# Patient Record
Sex: FEMALE | Race: AMERICAN INDIAN OR ALASKA NATIVE | NOT HISPANIC OR LATINO | Employment: FULL TIME | URBAN - METROPOLITAN AREA
[De-identification: names, ages, dates, MRNs, and addresses within clinical notes are randomized per-mention and may not be internally consistent; named-entity substitution may affect disease eponyms.]

---

## 2017-02-06 ENCOUNTER — NON-PROVIDER VISIT (OUTPATIENT)
Dept: OBGYN | Facility: CLINIC | Age: 22
End: 2017-02-06
Payer: MEDICAID

## 2017-02-06 DIAGNOSIS — Z32.01 PREGNANCY EXAMINATION OR TEST, POSITIVE RESULT: ICD-10-CM

## 2017-02-06 LAB
INT CON NEG: NEGATIVE
INT CON POS: POSITIVE
POC URINE PREGNANCY TEST: POSITIVE

## 2017-02-06 PROCEDURE — 81025 URINE PREGNANCY TEST: CPT | Performed by: NURSE PRACTITIONER

## 2017-02-28 ENCOUNTER — INITIAL PRENATAL (OUTPATIENT)
Dept: OBGYN | Facility: CLINIC | Age: 22
End: 2017-02-28
Payer: MEDICAID

## 2017-02-28 VITALS
WEIGHT: 141 LBS | HEIGHT: 64 IN | SYSTOLIC BLOOD PRESSURE: 112 MMHG | BODY MASS INDEX: 24.07 KG/M2 | DIASTOLIC BLOOD PRESSURE: 70 MMHG

## 2017-02-28 DIAGNOSIS — N93.8 DUB (DYSFUNCTIONAL UTERINE BLEEDING): ICD-10-CM

## 2017-02-28 LAB — IN CLINIC OB SCAN: NORMAL

## 2017-02-28 PROCEDURE — 76830 TRANSVAGINAL US NON-OB: CPT | Performed by: OBSTETRICS & GYNECOLOGY

## 2017-02-28 PROCEDURE — 99203 OFFICE O/P NEW LOW 30 MIN: CPT | Mod: 25 | Performed by: OBSTETRICS & GYNECOLOGY

## 2017-02-28 NOTE — PROGRESS NOTES
"CC: Confirmation of Pregnancy    HPI: Pt is a 20 yo  lmp unknown who presents for evaluation of amenorrhea.  She has had no bleeding since her last menses.  A urine pregnancy test was positive.  She denies vaginal spotting or pain.  She notes nausea and vomiting.      ROS: negative for dizziness, SOB, chest pain, palpitations, dysuria, vaginal discharge.    Blood pressure 112/70, height 1.626 m (5' 4\"), weight 63.957 kg (141 lb).    GENERAL: Alert, in no apparent distress  PSYCHIATRIC: Appropriate affect, intact insight and judgement.  ABDOMEN: Soft, nontender, nondistended.  No palpable masses. No hepatosplenomegaly.   No rebound or guarding.  No inguinal lymphadenopathy.  BACK: No CVA tenderness  EXTREMITIES: No edema, no calf tenderness.    GENITOURINARY:  Normal external genitalia, no lesions.  Normal urethral meatus, no masses or tenderness.  Normal bladder without fullness or masses.  Vagina well estrogenized, no vaginal discharge or lesions.  Cervix normal length, nontender.  Uterus 10 weeks size, normal shape and contour, nontender.  Adnexa nontender, no masses.  Normal anus and perineum.      TRANSVAGINAL ULTRASOUND - performed and interpreted by me    Chen intrauterine pregnancy with CRL measuring 2.93 cm, c/w 9 +5/7 weeks EGA, positive fetal cardiac activity noted. EDC 17.     No fluid in cul de sac.    Ovaries and cervix appear grossly normal. Cervix 3.78 cm.      ASSESSMENT/PLAN:  1. DUB visit - Chen IUP at 9 +5/7 wks.  Prenatal Vitamins.  F/U for NOB appointment 2-3 wks.  "

## 2017-02-28 NOTE — MR AVS SNAPSHOT
"Kadi Avendano   2017 9:30 AM   Initial Prenatal   MRN: [id number]    [facility]   Dept Phone:  [phone]    Description:  Female : 1995   Provider:  Rocio Carrero M.D.           Allergies as of 2017     No Known Allergies      Vital Signs     Blood Pressure Height Weight Body Mass Index Last Menstrual Period Smoking Status    112/70 mmHg 1.626 m (5' 4\") 63.957 kg (141 lb) 24.19 kg/m2 (LMP Unknown) Never Smoker       Basic Information     Date Of Birth Sex Race Ethnicity Preferred Language    1995 Female Unknown Unknown English      Your appointments     2017  9:30 AM   New OB Exam 1 with Rocio Carrero M.D.   [facility] [address] [id number] [phone]              Health Maintenance        Date Due Completion Dates    IMM HEP B VACCINE (1 of 3 - Primary Series) 1995 ---    IMM HEP A VACCINE (1 of 2 - Standard Series) 1996 ---    IMM HPV VACCINE (1 of 3 - Female 3 Dose Series) 2006 ---    IMM VARICELLA (CHICKENPOX) VACCINE (1 of 2 - 2 Dose Adolescent Series) 2008 ---    IMM MENINGOCOCCAL VACCINE (MCV4) (1 of 1) 2011 ---    IMM DTaP/Tdap/Td Vaccine (1 - Tdap) 2014 ---    IMM INFLUENZA (1) 2016 ---    PAP SMEAR 2016 ---            Current Immunizations     No immunizations on file.      Below and/or attached are the medications your provider expects you to take. Review all of your home medications and newly ordered medications with your provider and/or pharmacist. Follow medication instructions as directed by your provider and/or pharmacist. Please keep your medication list with you and share with your provider. Update the information when medications are discontinued, doses are changed, or new medications (including over-the-counter products) are added; and carry medication information at all times in the event of emergency situations     Allergies:  No Known " Allergies          Medications  Valid as of: February 28, 2017 -  9:26 AM    Generic Name Brand Name Tablet Size Instructions for use    Prenatal Vit w/ Fe Bisg-FA   Take  by mouth.        .                 Medicines prescribed today were sent to:     [facility]  [place] - [address]    [address] [place] [address]    Phone: [phone] Fax: [phone]    Open 24 Hours?: No      Medication refill instructions:       If your prescription bottle indicates you have medication refills left, it is not necessary to call your provider’s office. Please contact your pharmacy and they will refill your medication.    If your prescription bottle indicates you do not have any refills left, you may request refills at any time through one of the following ways: The online Huaqi Information Digital system (except Urgent Care), by calling your provider’s office, or by asking your pharmacy to contact your provider’s office with a refill request. Medication refills are processed only during regular business hours and may not be available until the next business day. Your provider may request additional information or to have a follow-up visit with you prior to refilling your medication.   *Please Note: Medication refills are assigned a new Rx number when refilled electronically. Your pharmacy may indicate that no refills were authorized even though a new prescription for the same medication is available at the pharmacy. Please request the medicine by name with the pharmacy before contacting your provider for a refill.           Huaqi Information Digital Access Code: [email or web address]  Expires: 3/8/2017  9:44 AM    Your email address is not on file at [facility].  Email Addresses are required for you to sign up for [employer] Digital, please contact [phone] to verify your personal information and to provide your email address prior to attempting to register for [employer]qi Information Digital[place]  [address]    Property Moose  A secure, online tool to manage your health information     [employer]’s Cardiome Pharmat® is a secure, online tool that connects you to your personalized health information from the privacy of your home -- day or night - making it very easy for you to manage your healthcare. Once the activation process is completed, you can even access your medical information using the Property Moose imelda, which is available for free in the Apple Imelda store or Google Play store.     To learn more about Property Moose, visit [email or web address]    There are two levels of access available (as shown below):   My Chart Features  [facility] Primary Care Doctor [facility]  Specialists [facility]  Urgent  Care Non-[facility] Primary Care Doctor   Email your healthcare team securely and privately 24/7 X X X    Manage appointments: schedule your next appointment; view details of past/upcoming appointments X      Request prescription refills. X      View recent personal medical records, including lab and immunizations X X X X   View health record, including health history, allergies, medications X X X X   Read reports about your outpatient visits, procedures, consult and ER notes X X X X   See your discharge summary, which is a recap of your hospital and/or ER visit that includes your diagnosis, lab results, and care plan X X  X     How to register for Property Moose:  Once your e-mail address has been verified, follow the following steps to sign up for Property Moose.     1. Go to  [email or web address]  2. Click on the Sign Up Now box, which takes you to the New Member Sign Up page. You will need to provide the following information:  a. Enter your Property Moose Access Code exactly as it appears at the top of this page. (You will not need to use this code after you’ve completed the sign-up process. If you do not sign up before the expiration date, you must request a new code.)   b. Enter your date of birth.   c. Enter your home email address.   d. Click Submit, and  follow the next screen’s instructions.  3. Create a Skuldtecht ID. This will be your EternoGen login ID and cannot be changed, so think of one that is secure and easy to remember.  4. Create a Skuldtecht password. You can change your password at any time.  5. Enter your Password Reset Question and Answer. This can be used at a later time if you forget your password.   6. Enter your e-mail address. This allows you to receive e-mail notifications when new information is available in EternoGen.  7. Click Sign Up. You can now view your health information.    For assistance activating your EternoGen account, call [phone]

## 2017-02-28 NOTE — PROGRESS NOTES
today. Unsure about LMP  On PNV  Phone # [phone]  Pharmacy verified with patient  C/o n/v

## 2017-04-12 ENCOUNTER — INITIAL PRENATAL (OUTPATIENT)
Dept: OBGYN | Facility: CLINIC | Age: 22
End: 2017-04-12
Payer: MEDICAID

## 2017-04-12 ENCOUNTER — HOSPITAL ENCOUNTER (OUTPATIENT)
Facility: MEDICAL CENTER | Age: 22
End: 2017-04-12
Attending: NURSE PRACTITIONER
Payer: MEDICAID

## 2017-04-12 VITALS
BODY MASS INDEX: 23.9 KG/M2 | WEIGHT: 140 LBS | HEIGHT: 64 IN | SYSTOLIC BLOOD PRESSURE: 100 MMHG | DIASTOLIC BLOOD PRESSURE: 62 MMHG

## 2017-04-12 DIAGNOSIS — Z34.02 SUPERVISION OF NORMAL FIRST PREGNANCY IN SECOND TRIMESTER: ICD-10-CM

## 2017-04-12 PROBLEM — Z34.00 SUPERVISION OF NORMAL FIRST PREGNANCY: Status: ACTIVE | Noted: 2017-04-12

## 2017-04-12 LAB
APPEARANCE UR: NORMAL
BILIRUB UR STRIP-MCNC: NORMAL MG/DL
COLOR UR AUTO: NORMAL
GLUCOSE UR STRIP.AUTO-MCNC: NEGATIVE MG/DL
KETONES UR STRIP.AUTO-MCNC: NEGATIVE MG/DL
LEUKOCYTE ESTERASE UR QL STRIP.AUTO: NORMAL
NITRITE UR QL STRIP.AUTO: NEGATIVE
PH UR STRIP.AUTO: 6 [PH] (ref 5–8)
PROT UR QL STRIP: NEGATIVE MG/DL
RBC UR QL AUTO: NEGATIVE
SP GR UR STRIP.AUTO: 1.02
UROBILINOGEN UR STRIP-MCNC: NORMAL MG/DL

## 2017-04-12 PROCEDURE — 59401 PR NEW OB VISIT: CPT | Performed by: NURSE PRACTITIONER

## 2017-04-12 PROCEDURE — 87591 N.GONORRHOEAE DNA AMP PROB: CPT

## 2017-04-12 PROCEDURE — 87491 CHLMYD TRACH DNA AMP PROBE: CPT

## 2017-04-12 PROCEDURE — 88175 CYTOPATH C/V AUTO FLUID REDO: CPT

## 2017-04-12 PROCEDURE — 81002 URINALYSIS NONAUTO W/O SCOPE: CPT | Performed by: NURSE PRACTITIONER

## 2017-04-12 NOTE — PROGRESS NOTES
"S:  Kadi Avendano is a 21 y.o.  who presents for her new OB exam.  She is 15w6d with and VICTORIA of Estimated Date of Delivery: 17 by . She has no complaints. Nausea was mostly in first trimester.  She is currently working with autistic people. No heavy lifting no chemical exposure. No ER visits or previous care in this pregnancy.     desires AFP.  declines CF.  Denies VB, LOF, or cramping.  Denies dysuria, vaginal DC. Reports no or possible fetal movement.     Pt is single and lives with FOB.  Pregnancy is desired.      Past Medical History   Diagnosis Date   • Anemia      Family History   Problem Relation Age of Onset   • No Known Problems Mother    • No Known Problems Father    • No Known Problems Brother      Social History     Social History   • Marital Status: Single     Spouse Name: N/A   • Number of Children: N/A   • Years of Education: N/A     Occupational History   • Not on file.     Social History Main Topics   • Smoking status: Never Smoker    • Smokeless tobacco: Never Used   • Alcohol Use: No   • Drug Use: No   • Sexual Activity:     Partners: Male      Comment: none. unplanned pregnancy     Other Topics Concern   • Not on file     Social History Narrative     OB History    Para Term  AB SAB TAB Ectopic Multiple Living   1               # Outcome Date GA Lbr Silvino/2nd Weight Sex Delivery Anes PTL Lv   1 Current                   History of HSV I or II in self or partner: no  History of Thyroid problems: no    O:    Filed Vitals:    17 0848   BP: 100/62   Height: 1.626 m (5' 4\")   Weight: 63.504 kg (140 lb)      See Prenatal Physical.    Wet mount: none      A:   1.  IUP @ 15w6d per         2.  S=D        3.  See problem list below              Patient Active Problem List    Diagnosis Date Noted   • Supervision of normal first pregnancy 2017         P:  1.  GC/CT & pap done        2.  Prenatal labs ordered - lab slip given        3.  Discussed PNV, diet, " avoidances and adequate water intake        4.  NOB packet given        5.  Return to office in 4 wks        6.  Complete OB US about 3 wks           No orders of the defined types were placed in this encounter.

## 2017-04-12 NOTE — MR AVS SNAPSHOT
"        Kadi Avendano   2017 8:30 AM   Initial Prenatal   MRN: [id number]    [facility]   Dept Phone:  [phone]    Description:  Female : 1995   Provider:  Ame Prajapati D.N.P.; [facility] INTAKE           Allergies as of 2017     No Known Allergies      You were diagnosed with     Supervision of normal first pregnancy in second trimester   [[id number]]         Vital Signs     Blood Pressure Height Weight Body Mass Index Last Menstrual Period Smoking Status    100/62 mmHg 1.626 m (5' 4\") 63.504 kg (140 lb) 24.02 kg/m2 (LMP Unknown) Never Smoker       Basic Information     Date Of Birth Sex Race Ethnicity Preferred Language    1995 Female Unknown Unknown English      Problem List              ICD-10-CM Priority Class Noted - Resolved    Supervision of normal first pregnancy Z34.00   2017 - Present      Health Maintenance        Date Due Completion Dates    IMM HEP B VACCINE (1 of 3 - Primary Series) 1995 ---    IMM HEP A VACCINE (1 of 2 - Standard Series) 1996 ---    IMM HPV VACCINE (1 of 3 - Female 3 Dose Series) 2006 ---    IMM VARICELLA (CHICKENPOX) VACCINE (1 of 2 - 2 Dose Adolescent Series) 2008 ---    IMM MENINGOCOCCAL VACCINE (MCV4) (1 of 1) 2011 ---    IMM DTaP/Tdap/Td Vaccine (1 - Tdap) 2014 ---    PAP SMEAR 2016 ---            Current Immunizations     No immunizations on file.      Below and/or attached are the medications your provider expects you to take. Review all of your home medications and newly ordered medications with your provider and/or pharmacist. Follow medication instructions as directed by your provider and/or pharmacist. Please keep your medication list with you and share with your provider. Update the information when medications are discontinued, doses are changed, or new medications (including over-the-counter products) are added; and carry medication information at all times in the event of emergency " situations     Allergies:  No Known Allergies          Medications  Valid as of: April 12, 2017 -  9:19 AM    Generic Name Brand Name Tablet Size Instructions for use    Prenatal Vit w/ Fe Bisg-FA   Take  by mouth.        .                 Medicines prescribed today were sent to:     [facility] [place] - [address]    [address] [place] [address]    Phone: [phone] Fax: [phone]    Open 24 Hours?: No      Medication refill instructions:       If your prescription bottle indicates you have medication refills left, it is not necessary to call your provider’s office. Please contact your pharmacy and they will refill your medication.    If your prescription bottle indicates you do not have any refills left, you may request refills at any time through one of the following ways: The online Myla system (except Urgent Care), by calling your provider’s office, or by asking your pharmacy to contact your provider’s office with a refill request. Medication refills are processed only during regular business hours and may not be available until the next business day. Your provider may request additional information or to have a follow-up visit with you prior to refilling your medication.   *Please Note: Medication refills are assigned a new Rx number when refilled electronically. Your pharmacy may indicate that no refills were authorized even though a new prescription for the same medication is available at the pharmacy. Please request the medicine by name with the pharmacy before contacting your provider for a refill.        Your To Do List     Future Labs/Procedures Complete By Expires    AFP MATERNAL SERUM ALPHA-FETOPROTEIN  As directed 4/12/2018    PREG CNTR PRENATAL PN  As directed 4/12/2018    THINPREP RFLX HPV ASCUS W/CTNG  As directed 4/12/2018    US-OB 2ND 3RD TRI COMPLETE  As directed 4/12/2018         Myla Access Code: [email or web address]  Expires: 5/12/2017   9:19 AM    Your email address is not on file at [facility].  Email Addresses are required for you to sign up for Manta Media, please contact [phone] to verify your personal information and to provide your email address prior to attempting to register for [employer]anta [employer] [address]    Fingot  A secure, online tool to manage your health information     [employer]’s Manta Media® is a secure, online tool that connects you to your personalized health information from the privacy of your home -- day or night - making it very easy for you to manage your healthcare. Once the activation process is completed, you can even access your medical information using the Manta Media imelda, which is available for free in the Apple Imelda store or Google Play store.     To learn more about Manta Media, visit [email or web address]    There are two levels of access available (as shown below):   My Chart Features  [facility] Primary Care Doctor [facility]  Specialists [facility]  Urgent  Care Non-[facility] Primary Care Doctor   Email your healthcare team securely and privately 24/7 X X X    Manage appointments: schedule your next appointment; view details of past/upcoming appointments X      Request prescription refills. X      View recent personal medical records, including lab and immunizations X X X X   View health record, including health history, allergies, medications X X X X   Read reports about your outpatient visits, procedures, consult and ER notes X X X X   See your discharge summary, which is a recap of your hospital and/or ER visit that includes your diagnosis, lab results, and care plan X X  X     How to register for Fingot:  Once your e-mail address has been verified, follow the following steps to sign up for Fingot.     1. Go to  [email or web address]  2. Click on the Sign Up Now box, which takes you to the New Member Sign Up page. You will need to provide the following information:  a. Enter your  Detectent Access Code exactly as it appears at the top of this page. (You will not need to use this code after you’ve completed the sign-up process. If you do not sign up before the expiration date, you must request a new code.)   b. Enter your date of birth.   c. Enter your home email address.   d. Click Submit, and follow the next screen’s instructions.  3. Create a Detectent ID. This will be your Detectent login ID and cannot be changed, so think of one that is secure and easy to remember.  4. Create a Ardiant password. You can change your password at any time.  5. Enter your Password Reset Question and Answer. This can be used at a later time if you forget your password.   6. Enter your e-mail address. This allows you to receive e-mail notifications when new information is available in Detectent.  7. Click Sign Up. You can now view your health information.    For assistance activating your Detectent account, call [phone]

## 2017-04-12 NOTE — PROGRESS NOTES
Pt here today for NOB visit   Pt had   C/o nausea and sharp pain on bellybutton  Reports some FM  Desires AFP  Declines CF  WT:140lb  BP: 100/62  Good # [phone]

## 2017-04-13 LAB
C TRACH DNA GENITAL QL NAA+PROBE: NEGATIVE
CYTOLOGY REG CYTOL: NORMAL
N GONORRHOEA DNA GENITAL QL NAA+PROBE: NEGATIVE
SPECIMEN SOURCE: NORMAL

## 2017-04-21 ENCOUNTER — HOSPITAL ENCOUNTER (OUTPATIENT)
Dept: LAB | Facility: MEDICAL CENTER | Age: 22
End: 2017-04-21
Attending: NURSE PRACTITIONER
Payer: MEDICAID

## 2017-04-21 DIAGNOSIS — Z34.02 SUPERVISION OF NORMAL FIRST PREGNANCY IN SECOND TRIMESTER: ICD-10-CM

## 2017-04-21 LAB
ABO GROUP BLD: NORMAL
APPEARANCE UR: ABNORMAL
BASOPHILS # BLD AUTO: 0.4 % (ref 0–1.8)
BASOPHILS # BLD: 0.03 K/UL (ref 0–0.12)
BILIRUB UR QL STRIP.AUTO: NEGATIVE
BLD GP AB SCN SERPL QL: NORMAL
COLOR UR: YELLOW
CULTURE IF INDICATED INDCX: YES UA CULTURE
EOSINOPHIL # BLD AUTO: 0.02 K/UL (ref 0–0.51)
EOSINOPHIL NFR BLD: 0.3 % (ref 0–6.9)
EPI CELLS #/AREA URNS HPF: ABNORMAL /HPF
ERYTHROCYTE [DISTWIDTH] IN BLOOD BY AUTOMATED COUNT: 41.4 FL (ref 35.9–50)
GLUCOSE UR STRIP.AUTO-MCNC: NEGATIVE MG/DL
HBV SURFACE AG SER QL: NEGATIVE
HCT VFR BLD AUTO: 42.3 % (ref 37–47)
HGB BLD-MCNC: 14.3 G/DL (ref 12–16)
HIV 1+2 AB+HIV1 P24 AG SERPL QL IA: NON REACTIVE
IMM GRANULOCYTES # BLD AUTO: 0.06 K/UL (ref 0–0.11)
IMM GRANULOCYTES NFR BLD AUTO: 0.9 % (ref 0–0.9)
KETONES UR STRIP.AUTO-MCNC: NEGATIVE MG/DL
LEUKOCYTE ESTERASE UR QL STRIP.AUTO: ABNORMAL
LYMPHOCYTES # BLD AUTO: 1.85 K/UL (ref 1–4.8)
LYMPHOCYTES NFR BLD: 27.4 % (ref 22–41)
MCH RBC QN AUTO: 29 PG (ref 27–33)
MCHC RBC AUTO-ENTMCNC: 33.8 G/DL (ref 33.6–35)
MCV RBC AUTO: 85.8 FL (ref 81.4–97.8)
MICRO URNS: ABNORMAL
MONOCYTES # BLD AUTO: 0.44 K/UL (ref 0–0.85)
MONOCYTES NFR BLD AUTO: 6.5 % (ref 0–13.4)
MUCOUS THREADS #/AREA URNS HPF: ABNORMAL /HPF
NEUTROPHILS # BLD AUTO: 4.35 K/UL (ref 2–7.15)
NEUTROPHILS NFR BLD: 64.5 % (ref 44–72)
NITRITE UR QL STRIP.AUTO: NEGATIVE
NRBC # BLD AUTO: 0 K/UL
NRBC BLD AUTO-RTO: 0 /100 WBC
PH UR STRIP.AUTO: 6.5 [PH]
PLATELET # BLD AUTO: 240 K/UL (ref 164–446)
PMV BLD AUTO: 11.5 FL (ref 9–12.9)
PROT UR QL STRIP: NEGATIVE MG/DL
RBC # BLD AUTO: 4.93 M/UL (ref 4.2–5.4)
RBC # URNS HPF: ABNORMAL /HPF
RBC UR QL AUTO: NEGATIVE
RH BLD: NORMAL
RUBV AB SER QL: 23.8 IU/ML
SP GR UR STRIP.AUTO: 1.02
SPERM #/AREA URNS HPF: PRESENT /HPF
TREPONEMA PALLIDUM IGG+IGM AB [PRESENCE] IN SERUM OR PLASMA BY IMMUNOASSAY: NON REACTIVE
WBC # BLD AUTO: 6.8 K/UL (ref 4.8–10.8)
WBC #/AREA URNS HPF: ABNORMAL /HPF

## 2017-04-21 PROCEDURE — 87086 URINE CULTURE/COLONY COUNT: CPT

## 2017-04-21 PROCEDURE — 82105 ALPHA-FETOPROTEIN SERUM: CPT

## 2017-04-21 PROCEDURE — 86780 TREPONEMA PALLIDUM: CPT

## 2017-04-21 PROCEDURE — 81001 URINALYSIS AUTO W/SCOPE: CPT

## 2017-04-21 PROCEDURE — 86762 RUBELLA ANTIBODY: CPT

## 2017-04-21 PROCEDURE — 86900 BLOOD TYPING SEROLOGIC ABO: CPT

## 2017-04-21 PROCEDURE — 86901 BLOOD TYPING SEROLOGIC RH(D): CPT

## 2017-04-21 PROCEDURE — 87340 HEPATITIS B SURFACE AG IA: CPT

## 2017-04-21 PROCEDURE — 36415 COLL VENOUS BLD VENIPUNCTURE: CPT

## 2017-04-21 PROCEDURE — 87389 HIV-1 AG W/HIV-1&-2 AB AG IA: CPT

## 2017-04-21 PROCEDURE — 86850 RBC ANTIBODY SCREEN: CPT

## 2017-04-21 PROCEDURE — 85025 COMPLETE CBC W/AUTO DIFF WBC: CPT

## 2017-04-23 LAB
BACTERIA UR CULT: ABNORMAL
BACTERIA UR CULT: ABNORMAL
SIGNIFICANT IND 70042: ABNORMAL
SOURCE SOURCE: ABNORMAL

## 2017-04-24 LAB
# FETUSES US: NORMAL
AFP MOM SERPL: 0.73
AFP SERPL-MCNC: 29 NG/ML
AGE - REPORTED: 21.7 YR
GA METHOD: NORMAL
GA: 17.14 WEEKS
IDDM PATIENT QL: NO
INTEGRATED SCN PATIENT-IMP: NORMAL

## 2017-05-08 ENCOUNTER — HOSPITAL ENCOUNTER (OUTPATIENT)
Dept: LAB | Facility: MEDICAL CENTER | Age: 22
End: 2017-05-08
Attending: NURSE PRACTITIONER
Payer: MEDICAID

## 2017-05-08 ENCOUNTER — ROUTINE PRENATAL (OUTPATIENT)
Dept: OBGYN | Facility: CLINIC | Age: 22
End: 2017-05-08
Payer: MEDICAID

## 2017-05-08 VITALS — SYSTOLIC BLOOD PRESSURE: 106 MMHG | DIASTOLIC BLOOD PRESSURE: 70 MMHG | BODY MASS INDEX: 24.88 KG/M2 | WEIGHT: 145 LBS

## 2017-05-08 DIAGNOSIS — Z34.00 SUPERVISION OF NORMAL FIRST PREGNANCY, ANTEPARTUM: ICD-10-CM

## 2017-05-08 PROCEDURE — 90040 PR PRENATAL FOLLOW UP: CPT | Performed by: NURSE PRACTITIONER

## 2017-05-08 PROCEDURE — 36415 COLL VENOUS BLD VENIPUNCTURE: CPT

## 2017-05-08 PROCEDURE — 81511 FTL CGEN ABNOR FOUR ANAL: CPT

## 2017-05-08 RX ORDER — HYDROCORTISONE ACETATE 25 MG/1
25 SUPPOSITORY RECTAL EVERY 12 HOURS
Qty: 20 SUPPOSITORY | Refills: 1 | Status: ON HOLD | OUTPATIENT
Start: 2017-05-08 | End: 2017-10-04

## 2017-05-08 NOTE — PROGRESS NOTES
OB f/u. some fetal movement.  No VB, LOF or UC's.  Wt: 145lb      BP: 106/70  Good phone # [phone]  Preferred pharmacy confirmed.  C/o hemorrhoids.  AFP done only for NTD. Patient agrees to do tetra today

## 2017-05-08 NOTE — PROGRESS NOTES
S) Pt is a 21 y.o.   at 19w4d  gestation. Routine prenatal care today. AFP done was the wrong one, only for NTD. Not the tetra. Patient wants to repeat the test for the quad.  Reports positive  fetal movement. Denies cramping, bleeding or leaking of fluid. Denies dysuria. Generally feels well today. Good self-care activities identified. Denies headaches.     O) see flow         Filed Vitals:    17 0817   BP: 106/70   Weight: 65.772 kg (145 lb)           Lab: normal prenatal panel       Pertinent ultrasound - scheduled for Friday.            A) IUP at 19w4d       S=D         Patient Active Problem List    Diagnosis Date Noted   • Supervision of normal first pregnancy 2017       P) s/s ptl vs general discomforts. Fetal movements reviewed. General ed and anticipatory guidance. Nutrition/exercise/vitamin. Plans breast. Continue PNV.

## 2017-05-08 NOTE — MR AVS SNAPSHOT
Kadi Avendano   2017 8:15 AM   Routine Prenatal   MRN: [id number]    Department:  [facility]   Dept Phone:  [phone]    Description:  Female : 1995   Provider:  Ame Prajapati D.N.P.           Allergies as of 2017     No Known Allergies      You were diagnosed with     Supervision of normal first pregnancy, antepartum   [4664445]         Vital Signs     Blood Pressure Weight Last Menstrual Period Smoking Status          106/70 mmHg 65.772 kg (145 lb) (LMP Unknown) Never Smoker         Basic Information     Date Of Birth Sex Race Ethnicity Preferred Language    1995 Female Unknown Unknown English      Your appointments     May 12, 2017  9:00 AM   US PREG 60 with PREG CTR US 1   [facility] ([facility])    [facility]  [address] [id number] [phone]           For [facility] patients only: 1. Please arrive 15 min prior to your appointment time. 2. If you're late, you will be rescheduled for the next available appointment. 3. If you need to reschedule your appointment, please call us at [phone] 48 hours prior to your appointment. 4. Do not bring children as they will not be allowed in exam room. 5. Only one family member may be present in room during exam. 6. The exam will be 30-60 minutes depending on the exam ordered by the physician. 7. The sonographer is not allowed to discuss findings during the exam. Your provider will go over the results with you at your next appointment. 8. The purpose of this ultrasound is to determine if baby is healthy. Diagnostic ultrasounds are NOT to determine the gender of the baby. 9. NO photography or video recording is allowed in exam room. 10. NO cell phones allowed in the exam room. INFORMACION SOBRE LUCAS ULTRASONIDO 1. Por favor de llegar 15 minutos antes de lucas constantino. 2. Si llega tarde, le tenemos que cambiar la constantino para otra fecha. 3. Si necesita  cambiar lucas constantino, por favor llame 48 horas antes de la constantino. [phone] 4. Por favor no traer niños. No se permiten en cuarto de Ultrasonido. 5. Solamente se permite alexy persona en el cuarto errol el examen. 6. El examen dura 30-60 minutos, dependiendo del examen ordenado por el Doctor. 7. El Sonógrafo no está autorizado hablar sobre lucas examen. Lucas doctor o partera le va explicar los resultados en lucas próxima constantino. 8. El propósito del Ultrasonido es para determinar si lucas jen viene saludable. No es para determinar el sexo de lucas jen. 9. Por favor no fotos o cámaras de grabar. 10. No celulares permitidos en el cuarto de examen.              Problem List              ICD-10-CM Priority Class Noted - Resolved    Supervision of normal first pregnancy Z34.00   4/12/2017 - Present      Health Maintenance        Date Due Completion Dates    IMM HEP B VACCINE (1 of 3 - Primary Series) 1995 ---    IMM HEP A VACCINE (1 of 2 - Standard Series) 12/22/1996 ---    IMM HPV VACCINE (1 of 3 - Female 3 Dose Series) 12/22/2006 ---    IMM VARICELLA (CHICKENPOX) VACCINE (1 of 2 - 2 Dose Adolescent Series) 12/22/2008 ---    IMM MENINGOCOCCAL VACCINE (MCV4) (1 of 1) 12/22/2011 ---    IMM DTaP/Tdap/Td Vaccine (1 - Tdap) 12/22/2014 ---    PAP SMEAR 4/12/2020 4/12/2017            Current Immunizations     No immunizations on file.      Below and/or attached are the medications your provider expects you to take. Review all of your home medications and newly ordered medications with your provider and/or pharmacist. Follow medication instructions as directed by your provider and/or pharmacist. Please keep your medication list with you and share with your provider. Update the information when medications are discontinued, doses are changed, or new medications (including over-the-counter products) are added; and carry medication information at all times in the event of emergency situations     Allergies:  No Known Allergies             Medications  Valid as of: May 08, 2017 -  8:30 AM    Generic Name Brand Name Tablet Size Instructions for use    Hydrocortisone Acetate (Suppos) ANUSOL-HC 25 MG Insert 1 Suppository in rectum every 12 hours.        Prenatal Vit w/ Fe Bisg-FA   Take  by mouth.        .                 Medicines prescribed today were sent to:     [facility] [place] - [address] [facility] [address]    [address] [facility] [place] [address]    Phone: [phone] Fax: [phone]    Open 24 Hours?: No      Medication refill instructions:       If your prescription bottle indicates you have medication refills left, it is not necessary to call your provider’s office. Please contact your pharmacy and they will refill your medication.    If your prescription bottle indicates you do not have any refills left, you may request refills at any time through one of the following ways: The online [employer] system (except Urgent Care), by calling your provider’s office, or by asking your pharmacy to contact your provider’s office with a refill request. Medication refills are processed only during regular business hours and may not be available until the next business day. Your provider may request additional information or to have a follow-up visit with you prior to refilling your medication.   *Please Note: Medication refills are assigned a new Rx number when refilled electronically. Your pharmacy may indicate that no refills were authorized even though a new prescription for the same medication is available at the pharmacy. Please request the medicine by name with the pharmacy before contacting your provider for a refill.        Your To Do List     Future Labs/Procedures Complete By Aleja SLAUGHTER  As directed 5/9/2018         [employer] Access Code: Activation code not generated  Current [employer] Status: Active

## 2017-05-10 LAB
# FETUSES US: NORMAL
AFP MOM SERPL: 0.69
AFP SERPL-MCNC: 38 NG/ML
AGE - REPORTED: 21.7 YR
GA METHOD: NORMAL
GA: 19.57 WEEKS
HCG MOM SERPL: 0.41
HCG SERPL-ACNC: 8042 IU/L
IDDM PATIENT QL: NO
INHIBIN A MOM SERPL: 0.92
INHIBIN A SERPL-MCNC: 159 PG/ML
INTEGRATED SCN PATIENT-IMP: NORMAL
PATHOLOGY STUDY: NORMAL
U ESTRIOL MOM SERPL: 1.28
U ESTRIOL SERPL-MCNC: 2.59 NG/ML

## 2017-05-12 ENCOUNTER — APPOINTMENT (OUTPATIENT)
Dept: RADIOLOGY | Facility: IMAGING CENTER | Age: 22
End: 2017-05-12
Attending: NURSE PRACTITIONER
Payer: MEDICAID

## 2017-05-12 ENCOUNTER — DATING (OUTPATIENT)
Dept: OBGYN | Facility: CLINIC | Age: 22
End: 2017-05-12

## 2017-05-12 DIAGNOSIS — Z34.02 SUPERVISION OF NORMAL FIRST PREGNANCY IN SECOND TRIMESTER: ICD-10-CM

## 2017-05-12 PROCEDURE — 76805 OB US >/= 14 WKS SNGL FETUS: CPT | Performed by: OBSTETRICS & GYNECOLOGY

## 2017-05-15 ENCOUNTER — DATING (OUTPATIENT)
Dept: OBGYN | Facility: CLINIC | Age: 22
End: 2017-05-15

## 2017-05-15 ENCOUNTER — ROUTINE PRENATAL (OUTPATIENT)
Dept: OBGYN | Facility: CLINIC | Age: 22
End: 2017-05-15
Payer: MEDICAID

## 2017-05-15 VITALS — BODY MASS INDEX: 24.88 KG/M2 | SYSTOLIC BLOOD PRESSURE: 124 MMHG | WEIGHT: 145 LBS | DIASTOLIC BLOOD PRESSURE: 84 MMHG

## 2017-05-15 DIAGNOSIS — Z34.02 ENCOUNTER FOR SUPERVISION OF NORMAL FIRST PREGNANCY IN SECOND TRIMESTER: Primary | ICD-10-CM

## 2017-05-15 PROCEDURE — 90040 PR PRENATAL FOLLOW UP: CPT | Performed by: NURSE PRACTITIONER

## 2017-05-15 NOTE — PROGRESS NOTES
S: Pt is  at 20w4d here for Centering session #1/2.   No concerns.  Reports positive FM.  Denies VB, LOF,  RUCs or vaginal DC.     O:  Please see above vitals       FHTs: 155       Fundal ht: 20 cm    A: IUP 20w4d  Patient Active Problem List    Diagnosis Date Noted   • Supervision of normal first pregnancy 2017         P:  1.  Reviewed labs w pt        2.  Normal  AFP          3.  US is scheduled        4.  Questions answered        5.  F/u 4wks for Centering session #3    Centering Topics reviewed:   1.  Prenatal testing, genetic screening  2.  Common discomforts of pregnancy  3.  Body changes in pregnancy  4.  Healthy lifestyle choices  5.  Other: Nutrition and exercise

## 2017-05-15 NOTE — MR AVS SNAPSHOT
Kadi Romana   5/15/2017 9:00 AM   Routine Prenatal   MRN: [id number]    Department:  [facility]   Dept Phone:  [phone]    Description:  Female : 1995   Provider:  SILAS Cook           Allergies as of 5/15/2017     No Known Allergies      You were diagnosed with     Encounter for supervision of normal first pregnancy in second trimester   [[id number]]  -  Primary       Vital Signs     Blood Pressure Weight Last Menstrual Period Smoking Status          124/84 mmHg 65.772 kg (145 lb) (LMP Unknown) Never Smoker         Basic Information     Date Of Birth Sex Race Ethnicity Preferred Language    1995 Female Unknown Unknown English      Your appointments     2017  9:00 AM   Group Visit with CENTERING   [facility] ([facility])    [address] [place] [address] [address]  [place] [address]   [phone]            Jul 10, 2017  9:00 AM   Group Visit with CENTERING   [facility] ([facility])    [address] [place] [address] [address]  [place] [address]   [phone]            Aug 07, 2017  9:00 AM   Group Visit with CENTERING   [facility] ([facility])    [address] [place] [address] [address]  [place] [address]   [phone]            Aug 21, 2017  9:00 AM   Group Visit with CENTERING   [facility] ([facility])    [address] [place] [address] [address]  [place] [address]   [phone]            Sep 18, 2017  9:00 AM   Group Visit with CENTERING   [facility] ([facility])    [address] [place] [address] [address]  [place] [address]   [phone]            Oct 02, 2017  9:00 AM   Group Visit with CENTERING   [facility] ([facility])    [address] [place] [address] [address]  [place] [address]   [phone]              Problem List              ICD-10-CM Priority Class Noted - Resolved    Supervision of normal first pregnancy Z34.00   2017 - Present      Health Maintenance        Date Due Completion Dates    IMM HEP B VACCINE (1 of 3 - Primary Series) 1995 ---    IMM HEP A VACCINE (1 of 2 - Standard Series) 1996 ---       IMM HPV VACCINE (1 of 3 - Female 3 Dose Series) 12/22/2006 ---    IMM VARICELLA (CHICKENPOX) VACCINE (1 of 2 - 2 Dose Adolescent Series) 12/22/2008 ---    IMM MENINGOCOCCAL VACCINE (MCV4) (1 of 1) 12/22/2011 ---    IMM DTaP/Tdap/Td Vaccine (1 - Tdap) 12/22/2014 ---    PAP SMEAR 4/12/2020 4/12/2017            Current Immunizations     No immunizations on file.      Below and/or attached are the medications your provider expects you to take. Review all of your home medications and newly ordered medications with your provider and/or pharmacist. Follow medication instructions as directed by your provider and/or pharmacist. Please keep your medication list with you and share with your provider. Update the information when medications are discontinued, doses are changed, or new medications (including over-the-counter products) are added; and carry medication information at all times in the event of emergency situations     Allergies:  No Known Allergies          Medications  Valid as of: May 15, 2017 - 12:28 PM    Generic Name Brand Name Tablet Size Instructions for use    Hydrocortisone Acetate (Suppos) ANUSOL-HC 25 MG Insert 1 Suppository in rectum every 12 hours.        Prenatal Vit w/ Fe Bisg-FA   Take  by mouth.        .                 Medicines prescribed today were sent to:     [facility] [address] [place] [address]    Phone: [phone] Fax: [phone]    Open 24 Hours?: No      Medication refill instructions:       If your prescription bottle indicates you have medication refills left, it is not necessary to call your provider’s office. Please contact your pharmacy and they will refill your medication.    If your prescription bottle indicates you do not have any refills left, you may request refills at any time through one of the following ways: The online [email or web address] system (except Urgent Care), by calling your provider’s office, or by  asking your pharmacy to contact your provider’s office with a refill request. Medication refills are processed only during regular business hours and may not be available until the next business day. Your provider may request additional information or to have a follow-up visit with you prior to refilling your medication.   *Please Note: Medication refills are assigned a new Rx number when refilled electronically. Your pharmacy may indicate that no refills were authorized even though a new prescription for the same medication is available at the pharmacy. Please request the medicine by name with the pharmacy before contacting your provider for a refill.        Instructions          1.  Reviewed labs w pt        2.  Normal  AFP          3.  US is scheduled        4.  Questions answered        5.  F/u 4wks for Centering session #3            [employer] Access Code: Activation code not generated  Current [employer] Status: Active

## 2017-05-15 NOTE — PATIENT INSTRUCTIONS
1.  Reviewed labs w pt        2.  Normal  AFP          3.  US is scheduled        4.  Questions answered        5.  F/u 4wks for Centering session #3

## 2017-06-07 ENCOUNTER — ROUTINE PRENATAL (OUTPATIENT)
Dept: OBGYN | Facility: CLINIC | Age: 22
End: 2017-06-07
Payer: MEDICAID

## 2017-06-07 VITALS — SYSTOLIC BLOOD PRESSURE: 112 MMHG | DIASTOLIC BLOOD PRESSURE: 68 MMHG | BODY MASS INDEX: 25.22 KG/M2 | WEIGHT: 147 LBS

## 2017-06-07 DIAGNOSIS — Z34.00 SUPERVISION OF NORMAL FIRST PREGNANCY, ANTEPARTUM: ICD-10-CM

## 2017-06-07 PROCEDURE — 90040 PR PRENATAL FOLLOW UP: CPT | Performed by: NURSE PRACTITIONER

## 2017-06-07 NOTE — MR AVS SNAPSHOT
Kadi Schneiderer   2017 8:30 AM   Routine Prenatal   MRN: [id number]    Department:  [facility]   Dept Phone:  [phone]    Description:  Female : 1995   Provider:  Ame Prajapati D.N.P.           Allergies as of 2017     No Known Allergies      You were diagnosed with     Supervision of normal first pregnancy, antepartum   [[id number]]         Vital Signs     Blood Pressure Weight Last Menstrual Period Smoking Status          112/68 mmHg 66.679 kg (147 lb) (LMP Unknown) Never Smoker         Basic Information     Date Of Birth Sex Race Ethnicity Preferred Language    1995 Female Unknown Unknown English      Your appointments     2017  9:00 AM   Group Visit with CENTERING   [facility] ([facility])    [address] [place] [address] [address]  [place] [address]   [phone]            Jul 10, 2017  9:00 AM   Group Visit with CENTERING   [facility] ([facility])    [address] [place] [address] [address]  [place] [address]   [phone]            Aug 07, 2017  9:00 AM   Group Visit with CENTERING   [facility] ([facility])    [address] [place] [address] [address]  [place] [address]   [phone]            Aug 21, 2017  9:00 AM   Group Visit with CENTERING   [facility] ([facility])    [address] [facility] [address]  [place] [address]   [phone]            Sep 18, 2017  9:00 AM   Group Visit with CENTERING   [facility] ([facility])    [address] [place] [address] [address]  [place] [address]   [phone]            Oct 02, 2017  9:00 AM   Group Visit with CENTERING   [facility] ([facility])    [address] [place] [address] [address]  [place] [address]   [phone]              Problem List              ICD-10-CM Priority Class Noted - Resolved    Supervision of normal first pregnancy Z34.00   2017 - Present      Health Maintenance        Date Due Completion Dates    IMM HEP B VACCINE (1 of 3 - Primary Series) 1995 ---    IMM HEP A VACCINE (1 of 2 - Standard Series) 1996 ---    IMM HPV VACCINE (1 of 3 - Female  3 Dose Series) 12/22/2006 ---    IMM VARICELLA (CHICKENPOX) VACCINE (1 of 2 - 2 Dose Adolescent Series) 12/22/2008 ---    IMM MENINGOCOCCAL VACCINE (MCV4) (1 of 1) 12/22/2011 ---    IMM DTaP/Tdap/Td Vaccine (1 - Tdap) 12/22/2014 ---    PAP SMEAR 4/12/2020 4/12/2017            Current Immunizations     No immunizations on file.      Below and/or attached are the medications your provider expects you to take. Review all of your home medications and newly ordered medications with your provider and/or pharmacist. Follow medication instructions as directed by your provider and/or pharmacist. Please keep your medication list with you and share with your provider. Update the information when medications are discontinued, doses are changed, or new medications (including over-the-counter products) are added; and carry medication information at all times in the event of emergency situations     Allergies:  No Known Allergies          Medications  Valid as of: June 07, 2017 -  8:49 AM    Generic Name Brand Name Tablet Size Instructions for use    Hydrocortisone Acetate (Suppos) ANUSOL-HC 25 MG Insert 1 Suppository in rectum every 12 hours.        Prenatal Vit w/ Fe Bisg-FA   Take  by mouth.        .                 Medicines prescribed today were sent to:     [facility] [address] [place] [address]    Phone: [phone] Fax: [phone]    Open 24 Hours?: No      Medication refill instructions:       If your prescription bottle indicates you have medication refills left, it is not necessary to call your provider’s office. Please contact your pharmacy and they will refill your medication.    If your prescription bottle indicates you do not have any refills left, you may request refills at any time through one of the following ways: The online [employer] system (except Urgent Care), by calling your provider’s office, or by asking your pharmacy to contact your  provider’s office with a refill request. Medication refills are processed only during regular business hours and may not be available until the next business day. Your provider may request additional information or to have a follow-up visit with you prior to refilling your medication.   *Please Note: Medication refills are assigned a new Rx number when refilled electronically. Your pharmacy may indicate that no refills were authorized even though a new prescription for the same medication is available at the pharmacy. Please request the medicine by name with the pharmacy before contacting your provider for a refill.        Your To Do List     Future Labs/Procedures Complete By Expires    GLUCOSE 1HR GESTATIONAL  As directed 6/8/2018    HCT  As directed 6/8/2018    HGB  As directed 6/8/2018    T.PALLIDUM AB EIA  As directed 6/7/2018         MyChart Access Code: Activation code not generated  Current [employer] Status: Active

## 2017-06-07 NOTE — PROGRESS NOTES
S) Pt is a 21 y.o.   at 23w6d  gestation. Routine prenatal care today. States no problems. Very supportive FOB present and involved.  Reports good  fetal movement. Denies cramping, bleeding or leaking of fluid. Denies dysuria. Generally feels well today. Good self-care activities identified. Denies headaches.     O) see flow         Filed Vitals:    17 0835   BP: 112/68   Weight: 66.679 kg (147 lb)           Lab: normal prenatal panel, normal AFP       Pertinent ultrasound -        Normal fetal survey     A) IUP at 23w6d       S=D         Patient Active Problem List    Diagnosis Date Noted   • Supervision of normal first pregnancy 2017     P) s/s ptl vs general discomforts. Fetal movements reviewed. General ed and anticipatory guidance. Nutrition/exercise/vitamin. Plans breast. Continue PNV.

## 2017-06-07 NOTE — PROGRESS NOTES
Pt. Here for OB/FU today. Reports Good FM.   Good # [phone]  Pt states no complaints.   Pharmacy verified.   Pt given 1 HR GTT and RPR lab slip today along with instructions.

## 2017-06-09 ENCOUNTER — HOSPITAL ENCOUNTER (OUTPATIENT)
Dept: LAB | Facility: MEDICAL CENTER | Age: 22
End: 2017-06-09
Attending: NURSE PRACTITIONER
Payer: MEDICAID

## 2017-06-09 DIAGNOSIS — Z34.00 SUPERVISION OF NORMAL FIRST PREGNANCY, ANTEPARTUM: ICD-10-CM

## 2017-06-09 LAB
GLUCOSE 1H P 50 G GLC PO SERPL-MCNC: 78 MG/DL (ref 70–139)
HCT VFR BLD AUTO: 40.5 % (ref 37–47)
HGB BLD-MCNC: 13.3 G/DL (ref 12–16)
TREPONEMA PALLIDUM IGG+IGM AB [PRESENCE] IN SERUM OR PLASMA BY IMMUNOASSAY: NON REACTIVE

## 2017-06-09 PROCEDURE — 85014 HEMATOCRIT: CPT

## 2017-06-09 PROCEDURE — 36415 COLL VENOUS BLD VENIPUNCTURE: CPT

## 2017-06-09 PROCEDURE — 85018 HEMOGLOBIN: CPT

## 2017-06-09 PROCEDURE — 82950 GLUCOSE TEST: CPT

## 2017-06-09 PROCEDURE — 86780 TREPONEMA PALLIDUM: CPT

## 2017-06-12 ENCOUNTER — ROUTINE PRENATAL (OUTPATIENT)
Dept: OBGYN | Facility: CLINIC | Age: 22
End: 2017-06-12
Payer: MEDICAID

## 2017-06-12 VITALS — SYSTOLIC BLOOD PRESSURE: 125 MMHG | BODY MASS INDEX: 26.08 KG/M2 | WEIGHT: 152 LBS | DIASTOLIC BLOOD PRESSURE: 80 MMHG

## 2017-06-12 DIAGNOSIS — Z34.02 ENCOUNTER FOR SUPERVISION OF NORMAL FIRST PREGNANCY IN SECOND TRIMESTER: Primary | ICD-10-CM

## 2017-06-12 PROCEDURE — 90040 PR PRENATAL FOLLOW UP: CPT | Performed by: NURSE PRACTITIONER

## 2017-06-12 NOTE — PROGRESS NOTES
S:  Pt is  at 24w4d VICTORIA: Estimated Date of Delivery: 17 here for Centering session #3.  No concerns today.  Reports positive FM.  Denies VB, RUCs, LOF or vaginal DC.    O:  Please see above vitals.        FHTs: 143        Fundal ht: 24    A:  IUP at 24w4d  Patient Active Problem List    Diagnosis Date Noted   • Supervision of normal first pregnancy 2017       P:  1.   Reviewed labs/US w pt.        2.   Questions answered.          3.   F/u 4wks Centering session #4    Centering Topics Reviewed:   1.  Mental relaxation  2.  Breastfeeding  3.  The Family I want to have  4.  Other topics: constipation, nausea.

## 2017-06-12 NOTE — MR AVS SNAPSHOT
Kadi Romana   2017 9:00 AM   Routine Prenatal   MRN: [id number]    Department:  [facility]   Dept Phone:  [phone]    Description:  Female : 1995   Provider:  SILAS Cook           Allergies as of 2017     No Known Allergies      You were diagnosed with     Encounter for supervision of normal first pregnancy in second trimester   [[id number]]  -  Primary       Vital Signs     Blood Pressure Weight Last Menstrual Period Smoking Status          125/80 mmHg 68.947 kg (152 lb) (LMP Unknown) Never Smoker         Basic Information     Date Of Birth Sex Race Ethnicity Preferred Language    1995 Female Unknown Unknown English      Your appointments     Jul 10, 2017  9:00 AM   Group Visit with CENTERING   [facility] ([facility])    [address] [place] [address] [address]  [place] [address]   [phone]            Aug 07, 2017  9:00 AM   Group Visit with CENTERING   [facility] ([facility])    [address] [place] [address] [address]  [place] [address]   [phone]            Aug 21, 2017  9:00 AM   Group Visit with CENTERING   [facility] ([facility])    [address] [place] [address] [address]  [place] [address]   [phone]            Sep 18, 2017  9:00 AM   Group Visit with CENTERING   [facility] ([facility])    [address] [place] [address] [address]  [place] [address]   [phone]            Oct 02, 2017  9:00 AM   Group Visit with CENTERING   [facility] ([facility])    [address] [place] [address] [address]  [place] [address]   [phone]              Problem List              ICD-10-CM Priority Class Noted - Resolved    Supervision of normal first pregnancy Z34.00   2017 - Present      Health Maintenance        Date Due Completion Dates    IMM HEP B VACCINE (1 of 3 - Primary Series) 1995 ---    IMM HEP A VACCINE (1 of 2 - Standard Series) 1996 ---    IMM HPV VACCINE (1 of 3 - Female 3 Dose Series) 2006 ---    IMM VARICELLA (CHICKENPOX) VACCINE (1 of 2 - 2 Dose Adolescent Series) 2008 ---     IMM MENINGOCOCCAL VACCINE (MCV4) (1 of 1) 12/22/2011 ---    IMM DTaP/Tdap/Td Vaccine (1 - Tdap) 12/22/2014 ---    PAP SMEAR 4/12/2020 4/12/2017            Current Immunizations     No immunizations on file.      Below and/or attached are the medications your provider expects you to take. Review all of your home medications and newly ordered medications with your provider and/or pharmacist. Follow medication instructions as directed by your provider and/or pharmacist. Please keep your medication list with you and share with your provider. Update the information when medications are discontinued, doses are changed, or new medications (including over-the-counter products) are added; and carry medication information at all times in the event of emergency situations     Allergies:  No Known Allergies          Medications  Valid as of: June 12, 2017 - 11:17 AM    Generic Name Brand Name Tablet Size Instructions for use    Hydrocortisone Acetate (Suppos) ANUSOL-HC 25 MG Insert 1 Suppository in rectum every 12 hours.        Prenatal Vit w/ Fe Bisg-FA   Take  by mouth.        .                 Medicines prescribed today were sent to:     [facility] [address] [place] [address]    Phone: [phone] Fax: [phone]    Open 24 Hours?: No      Medication refill instructions:       If your prescription bottle indicates you have medication refills left, it is not necessary to call your provider’s office. Please contact your pharmacy and they will refill your medication.    If your prescription bottle indicates you do not have any refills left, you may request refills at any time through one of the following ways: The online [employer] system (except Urgent Care), by calling your provider’s office, or by asking your pharmacy to contact your provider’s office with a refill request. Medication refills are processed only during regular business hours and may not  be available until the next business day. Your provider may request additional information or to have a follow-up visit with you prior to refilling your medication.   *Please Note: Medication refills are assigned a new Rx number when refilled electronically. Your pharmacy may indicate that no refills were authorized even though a new prescription for the same medication is available at the pharmacy. Please request the medicine by name with the pharmacy before contacting your provider for a refill.           [employer] Access Code: Activation code not generated  Current [employer] Status: Active

## 2017-07-17 ENCOUNTER — ROUTINE PRENATAL (OUTPATIENT)
Dept: OBGYN | Facility: CLINIC | Age: 22
End: 2017-07-17
Payer: MEDICAID

## 2017-07-17 VITALS — BODY MASS INDEX: 26.94 KG/M2 | DIASTOLIC BLOOD PRESSURE: 78 MMHG | SYSTOLIC BLOOD PRESSURE: 120 MMHG | WEIGHT: 157 LBS

## 2017-07-17 DIAGNOSIS — Z34.02 ENCOUNTER FOR SUPERVISION OF NORMAL FIRST PREGNANCY IN SECOND TRIMESTER: Primary | ICD-10-CM

## 2017-07-17 PROCEDURE — 90715 TDAP VACCINE 7 YRS/> IM: CPT | Performed by: PHYSICIAN ASSISTANT

## 2017-07-17 PROCEDURE — 90471 IMMUNIZATION ADMIN: CPT | Performed by: PHYSICIAN ASSISTANT

## 2017-07-17 PROCEDURE — 90040 PR PRENATAL FOLLOW UP: CPT | Performed by: PHYSICIAN ASSISTANT

## 2017-07-17 NOTE — Clinical Note
"Count Your Baby's Movements  Another step to a healthy delivery    Kadi Schneiderer             Dept: [phone]    How Many Weeks Pregnant? 29w4d    Date to Begin Countin17              How to use this chart    One way for your physician to keep track of your baby's health is by knowing how often the baby moves (or \"kicks\") in your womb.  You can help your physician to do this by using this chart every day.    Every day, you should see how many hours it takes for your baby to move 10 times.  Start in the morning, as soon as you get up.    · First, write down the time your baby moves until you get to 10.  · Check off one box every time your baby moves until you get to 10.  · Write down the time you finished counting in the last column.  · Total how long it took to count up all 10 movements.  · Finally, fill in the box that shows how long this took.  After counting 10 movements, you no longer have to count any more that day.  The next morning, just start counting again as soon as you get up.    What should you call a \"movement\"?  It is hard to say, because it will feel different from one mother to another and from one pregnancy to the next.  The important thing is that you count the movements the same way throughout your pregnancy.  If you have more questions, you should ask your physician.    Count carefully every day!  SAMPLE:  Week 28    How many hours did it take to feel 10 movements?       Start  Time     1     2     3     4     5     6     7     8     9     10   Finish Time   Mon 8:20 ·  ·  ·  ·  ·  ·  ·  ·  ·  ·  11:40                  Sat               Sun                 IMPORTANT: You should contact your physician if it takes more than two hours for you to feel 10 movements.  Each morning, write down the time and start to count the movements of your baby.  Keep track by checking off one box every time you feel one movement.  When you have " "felt 10 \"kicks\", write down the time you finished counting in the last column.  Then fill in the   box (over the check jaciel) for the number of hours it took.  Be sure to read the complete instructions on the previous page.            "

## 2017-07-17 NOTE — PROGRESS NOTES
Pt here today for OB follow up  Pt states no complaints   Reports +FM  Good # [phone]  Pharmacy Confirmed.  Pt given lynn sheet sheet.  Pt would like tdap.   Tdap vaccine given. right Deltoid. VIS given and screening check list reviewed with pt.

## 2017-07-17 NOTE — PROGRESS NOTES
Pt has no complaints with cramping, bleeding or pain. +FM - [facility] sheet given today. TDaP given today. 1hr GTT, H/H, RPR wnl - pt notified of results. Pt taking Centering, will return back in 8/7. 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 RTC 3-4 wk or sooner prn.

## 2017-07-17 NOTE — MR AVS SNAPSHOT
Kadi Avendano   2017 8:30 AM   Routine Prenatal   MRN: [id number]    [facility]   Dept Phone:  [phone]    Description:  Female : 1995   Provider:  BELÉN Hurd           Allergies as of 2017     No Known Allergies      You were diagnosed with     Encounter for supervision of normal first pregnancy in second trimester   [168592]  -  Primary       Vital Signs     Blood Pressure Weight Last Menstrual Period Smoking Status          120/78 mmHg 71.215 kg (157 lb) (LMP Unknown) Never Smoker         Basic Information     Date Of Birth Sex Race Ethnicity Preferred Language    1995 Female Unknown Unknown English      Your appointments     Aug 07, 2017  9:00 AM   Group Visit with CENTERING   [facility] ([facility]    [address] [place] [address] [address]  [place] [address]   [phone]            Aug 21, 2017  9:00 AM   Group Visit with CENTERING   [facility] ([facility])    [address] [place] [address] [address]  [place] [address]   [phone]            Sep 05, 2017  9:00 AM   Group Visit with CENTERING   [facility] ([facility])    [address] [place] [address] [address]  [place] [address]   [phone]            Sep 18, 2017  9:00 AM   Group Visit with CENTERING   [facility]    [address] [facility] [address]  [place] [address]   [phone]            Oct 02, 2017  9:00 AM   Group Visit with CENTERING   [facility] ([facility])    [address] [place] [address] [address]  [place] [address]   [phone]              Problem List              ICD-10-CM Priority Class Noted - Resolved    Supervision of normal first pregnancy Z34.00   2017 - Present      Health Maintenance        Date Due Completion Dates    IMM HEP B VACCINE (1 of 3 - Primary Series) 1995 ---    IMM HEP A VACCINE (1 of 2 - Standard Series) 1996 ---    IMM HPV VACCINE (1 of 3 - Female 3 Dose Series) 2006 ---    IMM VARICELLA (CHICKENPOX) VACCINE (1 of 2 - 2 Dose Adolescent Series) 2008 ---    IMM MENINGOCOCCAL VACCINE (MCV4) (1 of 1) 12/22/2011 ---    IMM DTaP/Tdap/Td Vaccine (1 - Tdap) 12/22/2014 ---    IMM INFLUENZA (1) 9/1/2017 ---    PAP SMEAR 4/12/2020 4/12/2017            Current Immunizations     Tdap Vaccine 7/17/2017  9:05 AM      Below and/or attached are the medications your provider expects you to take. Review all of your home medications and newly ordered medications with your provider and/or pharmacist. Follow medication instructions as directed by your provider and/or pharmacist. Please keep your medication list with you and share with your provider. Update the information when medications are discontinued, doses are changed, or new medications (including over-the-counter products) are added; and carry medication information at all times in the event of emergency situations     Allergies:  No Known Allergies          Medications  Valid as of: July 17, 2017 - 10:28 AM    Generic Name Brand Name Tablet Size Instructions for use    Hydrocortisone Acetate (Suppos) ANUSOL-HC 25 MG Insert 1 Suppository in rectum every 12 hours.        Prenatal Vit w/ Fe Bisg-FA   Take  by mouth.        .                 Medicines prescribed today were sent to:     [facility] [address] [place] [address] [address] [facility]    [address] [place] [address]    Phone: [phone] Fax: [phone]    Open 24 Hours?: No      Medication refill instructions:       If your prescription bottle indicates you have medication refills left, it is not necessary to call your provider’s office. Please contact your pharmacy and they will refill your medication.    If your prescription bottle indicates you do not have any refills left, you may request refills at any time through one of the following ways: The online [employer] system (except Urgent Care), by calling your provider’s office, or by asking your pharmacy to contact your provider’s office with a refill request. Medication refills are processed only  during regular business hours and may not be available until the next business day. Your provider may request additional information or to have a follow-up visit with you prior to refilling your medication.   *Please Note: Medication refills are assigned a new Rx number when refilled electronically. Your pharmacy may indicate that no refills were authorized even though a new prescription for the same medication is available at the pharmacy. Please request the medicine by name with the pharmacy before contacting your provider for a refill.        Other Notes About Your Plan     GIRL - Alea           MyChart Access Code: Activation code not generated  Current nuMVChart Status: Active

## 2017-08-07 ENCOUNTER — ROUTINE PRENATAL (OUTPATIENT)
Dept: OBGYN | Facility: CLINIC | Age: 22
End: 2017-08-07
Payer: MEDICAID

## 2017-08-07 VITALS — BODY MASS INDEX: 27.97 KG/M2 | DIASTOLIC BLOOD PRESSURE: 88 MMHG | SYSTOLIC BLOOD PRESSURE: 134 MMHG | WEIGHT: 163 LBS

## 2017-08-07 DIAGNOSIS — Z34.03 ENCOUNTER FOR SUPERVISION OF NORMAL FIRST PREGNANCY IN THIRD TRIMESTER: ICD-10-CM

## 2017-08-07 PROCEDURE — 90040 PR PRENATAL FOLLOW UP: CPT | Performed by: NURSE PRACTITIONER

## 2017-08-07 NOTE — MR AVS SNAPSHOT
Kadi Schneiderer   2017 9:00 AM   Routine Prenatal   MRN: [id number]    [facility]   Dept Phone:  [phone]    Description:  Female : 1995   Provider:  Ame Prajapati D.N.P.           Allergies as of 2017     No Known Allergies      You were diagnosed with     Encounter for supervision of normal first pregnancy in third trimester   [448502]         Vital Signs     Blood Pressure Weight Last Menstrual Period Smoking Status          134/88 mmHg 73.936 kg (163 lb) (LMP Unknown) Never Smoker         Basic Information     Date Of Birth Sex Race Ethnicity Preferred Language    1995 Female Unknown Unknown English      Your appointments     Aug 21, 2017  9:00 AM   Group Visit with CENTERING   [facility] ([facility])    [address] [place] [address] [address]  [place] [address]   [phone]            Sep 05, 2017  9:00 AM   Group Visit with CENTERING   [facility])    [address] [place] [address] [address]  [place] [address]   [phone]            Sep 18, 2017  9:00 AM   Group Visit with CENTERING   [facility] ([facility])    [address] [place] [address] [address]  [place] [address]   [phone]            Oct 02, 2017  9:00 AM   Group Visit with CENTERING   [facility] ([facility])    [address] [place] [address] [address]  [place] [address]   [phone]              Problem List              ICD-10-CM Priority Class Noted - Resolved    Supervision of normal first pregnancy Z34.00   2017 - Present      Health Maintenance        Date Due Completion Dates    IMM HEP B VACCINE (1 of 3 - Primary Series) 1995 ---    IMM HEP A VACCINE (1 of 2 - Standard Series) 1996 ---    IMM HPV VACCINE (1 of 3 - Female 3 Dose Series) 2006 ---    IMM VARICELLA (CHICKENPOX) VACCINE (1 of 2 - 2 Dose Adolescent Series) 2008 ---    IMM MENINGOCOCCAL VACCINE (MCV4) (1 of 1) 2011 ---    IMM INFLUENZA (1) 2017 ---    PAP SMEAR 2020    IMM DTaP/Tdap/Td Vaccine (2 - Td)  7/17/2027 7/17/2017            Current Immunizations     Tdap Vaccine 7/17/2017  9:05 AM      Below and/or attached are the medications your provider expects you to take. Review all of your home medications and newly ordered medications with your provider and/or pharmacist. Follow medication instructions as directed by your provider and/or pharmacist. Please keep your medication list with you and share with your provider. Update the information when medications are discontinued, doses are changed, or new medications (including over-the-counter products) are added; and carry medication information at all times in the event of emergency situations     Allergies:  No Known Allergies          Medications  Valid as of: August 07, 2017 - 11:25 AM    Generic Name Brand Name Tablet Size Instructions for use    Hydrocortisone Acetate (Suppos) ANUSOL-HC 25 MG Insert 1 Suppository in rectum every 12 hours.        Prenatal Vit w/ Fe Bisg-FA   Take  by mouth.        .                 Medicines prescribed today were sent to:     [facility] [address] [place] [address] - [facility] [address]    Phone: [phone] Fax: [phone]    Open 24 Hours?: No      Medication refill instructions:       If your prescription bottle indicates you have medication refills left, it is not necessary to call your provider’s office. Please contact your pharmacy and they will refill your medication.    If your prescription bottle indicates you do not have any refills left, you may request refills at any time through one of the following ways: The online [employer] system (except Urgent Care), by calling your provider’s office, or by asking your pharmacy to contact your provider’s office with a refill request. Medication refills are processed only during regular business hours and may not be available until the next business day. Your provider may request additional information or to have a follow-up visit  with you prior to refilling your medication.   *Please Note: Medication refills are assigned a new Rx number when refilled electronically. Your pharmacy may indicate that no refills were authorized even though a new prescription for the same medication is available at the pharmacy. Please request the medicine by name with the pharmacy before contacting your provider for a refill.        Other Notes About Your Plan     GIRL - Alea           MyChart Access Code: Activation code not generated  Current Talkwheelhart Status: Active

## 2017-08-21 ENCOUNTER — ROUTINE PRENATAL (OUTPATIENT)
Dept: OBGYN | Facility: CLINIC | Age: 22
End: 2017-08-21
Payer: MEDICAID

## 2017-08-21 VITALS — BODY MASS INDEX: 27.11 KG/M2 | WEIGHT: 158 LBS | DIASTOLIC BLOOD PRESSURE: 86 MMHG | SYSTOLIC BLOOD PRESSURE: 137 MMHG

## 2017-08-21 DIAGNOSIS — Z34.03 ENCOUNTER FOR SUPERVISION OF NORMAL FIRST PREGNANCY IN THIRD TRIMESTER: Primary | ICD-10-CM

## 2017-08-21 PROCEDURE — 90040 PR PRENATAL FOLLOW UP: CPT | Performed by: NURSE PRACTITIONER

## 2017-08-21 NOTE — MR AVS SNAPSHOT
Kadi Romana   2017 9:00 AM   Routine Prenatal   MRN: [id number]    [facility]   Dept Phone:  [phone]    Description:  Female : 1995   Provider:  SILAS Cook           Allergies as of 2017     No Known Allergies      You were diagnosed with     Encounter for supervision of normal first pregnancy in third trimester   [685091]  -  Primary       Vital Signs     Blood Pressure Weight Last Menstrual Period Smoking Status          137/86 mmHg 71.668 kg (158 lb) (LMP Unknown) Never Smoker         Basic Information     Date Of Birth Sex Race Ethnicity Preferred Language    1995 Female Unknown Unknown English      Your appointments     Sep 05, 2017  9:00 AM   Group Visit with CENTERING   [facility] ([facility])    [address] [place] [address] [address]  [place] [address]   [phone]            Sep 18, 2017  9:00 AM   Group Visit with CENTERING   [facility] ([facility])    [address] [place] [address] [address]  [place] [address]   [phone]            Oct 02, 2017  9:00 AM   Group Visit with CENTERING   [facility] ([facility])    [address] [place] [address] [address]  [place] [address]   [phone]              Problem List              ICD-10-CM Priority Class Noted - Resolved    Supervision of normal first pregnancy Z34.00   2017 - Present      Health Maintenance        Date Due Completion Dates    IMM HEP B VACCINE (1 of 3 - Primary Series) 1995 ---    IMM HEP A VACCINE (1 of 2 - Standard Series) 1996 ---    IMM HPV VACCINE (1 of 3 - Female 3 Dose Series) 2006 ---    IMM VARICELLA (CHICKENPOX) VACCINE (1 of 2 - 2 Dose Adolescent Series) 2008 ---    IMM MENINGOCOCCAL VACCINE (MCV4) (1 of 1) 2011 ---    IMM INFLUENZA (1) 2017 ---    PAP SMEAR 2020    IMM DTaP/Tdap/Td Vaccine (2 - Td) 2027            Current Immunizations     Tdap Vaccine 2017  9:05 AM      Below and/or attached are the medications your  provider expects you to take. Review all of your home medications and newly ordered medications with your provider and/or pharmacist. Follow medication instructions as directed by your provider and/or pharmacist. Please keep your medication list with you and share with your provider. Update the information when medications are discontinued, doses are changed, or new medications (including over-the-counter products) are added; and carry medication information at all times in the event of emergency situations     Allergies:  No Known Allergies          Medications  Valid as of: August 21, 2017 - 11:16 AM    Generic Name Brand Name Tablet Size Instructions for use    Hydrocortisone Acetate (Suppos) ANUSOL-HC 25 MG Insert 1 Suppository in rectum every 12 hours.        Prenatal Vit w/ Fe Bisg-FA   Take  by mouth.        .                 Medicines prescribed today were sent to:     [facility] [address] [place] [address] - [facility]    [address] [place] [address]    Phone: [phone] Fax: [phone]    Open 24 Hours?: No      Medication refill instructions:       If your prescription bottle indicates you have medication refills left, it is not necessary to call your provider’s office. Please contact your pharmacy and they will refill your medication.    If your prescription bottle indicates you do not have any refills left, you may request refills at any time through one of the following ways: The online [employer] system (except Urgent Care), by calling your provider’s office, or by asking your pharmacy to contact your provider’s office with a refill request. Medication refills are processed only during regular business hours and may not be available until the next business day. Your provider may request additional information or to have a follow-up visit with you prior to refilling your medication.   *Please Note: Medication refills are assigned a new Rx number when refilled electronically.  Your pharmacy may indicate that no refills were authorized even though a new prescription for the same medication is available at the pharmacy. Please request the medicine by name with the pharmacy before contacting your provider for a refill.        Other Notes About Your Plan     GIRL - Alea           MyChart Access Code: Activation code not generated  Current MyChart Status: Active

## 2017-08-21 NOTE — PROGRESS NOTES
S:  Pt is  at 34w4d for Centering session #6.  Reports back and hip pain and feeling tired.  Reports good FM.  Denies VB, LOF, RUCs or vaginal DC.    O:  Please see above vitals.        FHTs: 137        Fundal ht: 33 cm          A:  IUP at 34w4d  Patient Active Problem List    Diagnosis Date Noted   • Supervision of normal first pregnancy 2017        P:  1.  Questions answered.          2.  Encouraged adequate water intake        3.  F/u 2wks Centering Session #7        4.  Continue FKCs.      Centering Topics Reviewed:  1.  The Birth Experience

## 2017-09-05 ENCOUNTER — HOSPITAL ENCOUNTER (OUTPATIENT)
Facility: MEDICAL CENTER | Age: 22
End: 2017-09-05
Attending: NURSE PRACTITIONER
Payer: MEDICAID

## 2017-09-05 ENCOUNTER — ROUTINE PRENATAL (OUTPATIENT)
Dept: OBGYN | Facility: CLINIC | Age: 22
End: 2017-09-05
Payer: MEDICAID

## 2017-09-05 VITALS — BODY MASS INDEX: 29.7 KG/M2 | WEIGHT: 173 LBS | DIASTOLIC BLOOD PRESSURE: 66 MMHG | SYSTOLIC BLOOD PRESSURE: 134 MMHG

## 2017-09-05 DIAGNOSIS — Z34.03 ENCOUNTER FOR SUPERVISION OF NORMAL FIRST PREGNANCY IN THIRD TRIMESTER: ICD-10-CM

## 2017-09-05 PROCEDURE — 90040 PR PRENATAL FOLLOW UP: CPT | Performed by: NURSE PRACTITIONER

## 2017-09-05 PROCEDURE — 87653 STREP B DNA AMP PROBE: CPT

## 2017-09-05 NOTE — PROGRESS NOTES
S:  Pt is  at 36w5d for Centering session #7.  No concerns today.  Reports good FM.  Denies VB, LOF, RUCs or vaginal DC.  O:  Please see above vitals.        FHTs: 147        Fundal ht: 37    A:  IUP at 36w5d  Patient Active Problem List    Diagnosis Date Noted   • Supervision of normal first pregnancy 2017        P:  1.  Questions answered.          2.  Encouraged adequate water intake        3.  GBS @ 35wks        4.  F/u 2wks Centering Session #8    Centering Topics Reviewed:  1.  The 's first days  2.  Planning pediatric care  3.  Caring for your baby  4.  Circumcision  5.  Brothers & sisters  6.   -- when to call  7.  Other topics: Car seat safety

## 2017-09-06 LAB — GP B STREP DNA SPEC QL NAA+PROBE: NEGATIVE

## 2017-09-18 ENCOUNTER — HOSPITAL ENCOUNTER (OUTPATIENT)
Dept: LAB | Facility: MEDICAL CENTER | Age: 22
End: 2017-09-18
Attending: NURSE PRACTITIONER
Payer: MEDICAID

## 2017-09-18 ENCOUNTER — ROUTINE PRENATAL (OUTPATIENT)
Dept: OBGYN | Facility: CLINIC | Age: 22
End: 2017-09-18
Payer: MEDICAID

## 2017-09-18 VITALS — BODY MASS INDEX: 31.07 KG/M2 | WEIGHT: 181 LBS | SYSTOLIC BLOOD PRESSURE: 126 MMHG | DIASTOLIC BLOOD PRESSURE: 79 MMHG

## 2017-09-18 DIAGNOSIS — I15.9 SECONDARY HYPERTENSION: ICD-10-CM

## 2017-09-18 DIAGNOSIS — O16.1 ELEVATED BLOOD PRESSURE AFFECTING PREGNANCY IN FIRST TRIMESTER, ANTEPARTUM: ICD-10-CM

## 2017-09-18 DIAGNOSIS — Z23 NEED FOR PROPHYLACTIC VACCINATION AND INOCULATION AGAINST INFLUENZA: ICD-10-CM

## 2017-09-18 DIAGNOSIS — Z34.03 ENCOUNTER FOR SUPERVISION OF NORMAL FIRST PREGNANCY IN THIRD TRIMESTER: Primary | ICD-10-CM

## 2017-09-18 LAB
APPEARANCE UR: NORMAL
BILIRUB UR STRIP-MCNC: NORMAL MG/DL
COLOR UR AUTO: NORMAL
GLUCOSE UR STRIP.AUTO-MCNC: NEGATIVE MG/DL
KETONES UR STRIP.AUTO-MCNC: NEGATIVE MG/DL
LEUKOCYTE ESTERASE UR QL STRIP.AUTO: NORMAL
NITRITE UR QL STRIP.AUTO: NEGATIVE
NST ACOUSTIC STIMULATION: NORMAL
NST ACTION NECESSARY: NORMAL
NST ASSESSMENT: NORMAL
NST BASELINE: NORMAL
NST INDICATIONS: NORMAL
NST OTHER DATA: NORMAL
NST READ BY: NORMAL
NST RETURN: NORMAL
NST UTERINE ACTIVITY: NORMAL
PH UR STRIP.AUTO: 6.5 [PH] (ref 5–8)
PROT UR QL STRIP: NORMAL MG/DL
RBC UR QL AUTO: NEGATIVE
SP GR UR STRIP.AUTO: 1.02
UROBILINOGEN UR STRIP-MCNC: NORMAL MG/DL

## 2017-09-18 PROCEDURE — 90471 IMMUNIZATION ADMIN: CPT | Performed by: NURSE PRACTITIONER

## 2017-09-18 PROCEDURE — 90040 PR PRENATAL FOLLOW UP: CPT | Performed by: NURSE PRACTITIONER

## 2017-09-18 PROCEDURE — 81002 URINALYSIS NONAUTO W/O SCOPE: CPT | Performed by: NURSE PRACTITIONER

## 2017-09-18 PROCEDURE — 59025 FETAL NON-STRESS TEST: CPT | Performed by: NURSE PRACTITIONER

## 2017-09-18 PROCEDURE — 90686 IIV4 VACC NO PRSV 0.5 ML IM: CPT | Performed by: NURSE PRACTITIONER

## 2017-09-18 NOTE — PATIENT INSTRUCTIONS
P:  1.  Questions answered.          2.  Encouraged adequate water intake        3.  GBS status reviewed w pt.        4.  F/u 1wks JULIET        5.  Flu shot given.        6.  PIH labs ordered.      Centering Topics Reviewed:  1.  Pregnancy to parenting transition  2.  Emotional adjustments  3.  Postpartum depression  4.  Pregnancy -- when to call  5.  Breastfeeding.

## 2017-09-18 NOTE — PROGRESS NOTES
S:  Pt is  at 38w4d for Centering session #8.  Reports cramping.  Reports good FM.  Denies VB, LOF, RUCs or vaginal DC. Denies HA, blurry vision or epigastric pain.     O:  Please see above vitals.        FHTs: 142        Fundal ht: 38        Fetal position: vertex        SVE: Ft/50/-2        GBS neg on 17        UA:  2+ protein        NST baseline 140s +accels -decels mod variability.         [place]: one UC        Serial BPs: 120s/70s          A:  IUP at 38w4d  Reactive NST cat I FHTs   Patient Active Problem List    Diagnosis Date Noted   • Supervision of normal first pregnancy 2017        P:  1.  Questions answered.          2.  Encouraged adequate water intake        3.  GBS status reviewed w pt.        4.  F/u 1wks JULIET        5.  Flu shot given.        6.  PIH labs ordered.      Centering Topics Reviewed:  1.  Pregnancy to parenting transition  2.  Emotional adjustments  3.  Postpartum depression  4.  Pregnancy -- when to call  5.  Breastfeeding.    I have placed the below orders and discussed them with an approved delegating provider. The MA is performing the below orders under the direction of  Dr. Browning.

## 2017-09-19 ENCOUNTER — HOSPITAL ENCOUNTER (OUTPATIENT)
Dept: LAB | Facility: MEDICAL CENTER | Age: 22
End: 2017-09-19
Attending: NURSE PRACTITIONER
Payer: MEDICAID

## 2017-09-19 DIAGNOSIS — O16.1 ELEVATED BLOOD PRESSURE AFFECTING PREGNANCY IN FIRST TRIMESTER, ANTEPARTUM: ICD-10-CM

## 2017-09-19 LAB
ALBUMIN SERPL BCP-MCNC: 3.2 G/DL (ref 3.2–4.9)
ALBUMIN/GLOB SERPL: 1 G/DL
ALP SERPL-CCNC: 166 U/L (ref 30–99)
ALT SERPL-CCNC: 14 U/L (ref 2–50)
ANION GAP SERPL CALC-SCNC: 10 MMOL/L (ref 0–11.9)
AST SERPL-CCNC: 17 U/L (ref 12–45)
BASOPHILS # BLD AUTO: 0.2 % (ref 0–1.8)
BASOPHILS # BLD: 0.01 K/UL (ref 0–0.12)
BILIRUB SERPL-MCNC: 0.3 MG/DL (ref 0.1–1.5)
BUN SERPL-MCNC: 9 MG/DL (ref 8–22)
CALCIUM SERPL-MCNC: 9 MG/DL (ref 8.5–10.5)
CHLORIDE SERPL-SCNC: 106 MMOL/L (ref 96–112)
CO2 SERPL-SCNC: 20 MMOL/L (ref 20–33)
COLLECT DURATION TIME UR: 24 HR
CREAT CL/1.73 SQ M ?TM UR+SERPL-ARVRAT: 196 ML/MIN (ref 88–128)
CREAT SERPL-MCNC: 0.57 MG/DL (ref 0.5–1.4)
CREAT SERPL-MCNC: 0.58 MG/DL (ref 0.5–1.4)
CREAT UR-MCNC: 49.9 MG/DL
EOSINOPHIL # BLD AUTO: 0.01 K/UL (ref 0–0.51)
EOSINOPHIL NFR BLD: 0.2 % (ref 0–6.9)
ERYTHROCYTE [DISTWIDTH] IN BLOOD BY AUTOMATED COUNT: 41.7 FL (ref 35.9–50)
GFR SERPL CREATININE-BSD FRML MDRD: >60 ML/MIN/1.73 M 2
GLOBULIN SER CALC-MCNC: 3.3 G/DL (ref 1.9–3.5)
GLUCOSE SERPL-MCNC: 103 MG/DL (ref 65–99)
HCT VFR BLD AUTO: 38.4 % (ref 37–47)
HGB BLD-MCNC: 12.7 G/DL (ref 12–16)
IMM GRANULOCYTES # BLD AUTO: 0.09 K/UL (ref 0–0.11)
IMM GRANULOCYTES NFR BLD AUTO: 1.4 % (ref 0–0.9)
LYMPHOCYTES # BLD AUTO: 1.11 K/UL (ref 1–4.8)
LYMPHOCYTES NFR BLD: 16.9 % (ref 22–41)
MCH RBC QN AUTO: 29.2 PG (ref 27–33)
MCHC RBC AUTO-ENTMCNC: 33.1 G/DL (ref 33.6–35)
MCV RBC AUTO: 88.3 FL (ref 81.4–97.8)
MONOCYTES # BLD AUTO: 0.56 K/UL (ref 0–0.85)
MONOCYTES NFR BLD AUTO: 8.5 % (ref 0–13.4)
NEUTROPHILS # BLD AUTO: 4.79 K/UL (ref 2–7.15)
NEUTROPHILS NFR BLD: 72.8 % (ref 44–72)
NRBC # BLD AUTO: 0 K/UL
NRBC BLD AUTO-RTO: 0 /100 WBC
PLATELET # BLD AUTO: 174 K/UL (ref 164–446)
PMV BLD AUTO: 13.1 FL (ref 9–12.9)
POTASSIUM SERPL-SCNC: 4 MMOL/L (ref 3.6–5.5)
PROT 24H UR-MCNC: 224 MG/24 HR (ref 30–150)
PROT 24H UR-MRATE: 6.4 MG/DL (ref 0–15)
PROT SERPL-MCNC: 6.5 G/DL (ref 6–8.2)
RBC # BLD AUTO: 4.35 M/UL (ref 4.2–5.4)
SODIUM SERPL-SCNC: 136 MMOL/L (ref 135–145)
SPECIMEN VOL UR: 3500 ML
SPECIMEN VOL UR: 3500 ML
URATE SERPL-MCNC: 5 MG/DL (ref 1.9–8.2)
URINE CREATININE EXCRETED 1125: 1747 MG/24 HR
WBC # BLD AUTO: 6.6 K/UL (ref 4.8–10.8)

## 2017-09-19 PROCEDURE — 84550 ASSAY OF BLOOD/URIC ACID: CPT

## 2017-09-19 PROCEDURE — 36415 COLL VENOUS BLD VENIPUNCTURE: CPT

## 2017-09-19 PROCEDURE — 84156 ASSAY OF PROTEIN URINE: CPT

## 2017-09-19 PROCEDURE — 85025 COMPLETE CBC W/AUTO DIFF WBC: CPT

## 2017-09-19 PROCEDURE — 80053 COMPREHEN METABOLIC PANEL: CPT

## 2017-09-19 PROCEDURE — 81050 URINALYSIS VOLUME MEASURE: CPT

## 2017-09-19 PROCEDURE — 82575 CREATININE CLEARANCE TEST: CPT

## 2017-09-25 ENCOUNTER — HOSPITAL ENCOUNTER (OUTPATIENT)
Facility: MEDICAL CENTER | Age: 22
End: 2017-09-25
Attending: OBSTETRICS & GYNECOLOGY | Admitting: OBSTETRICS & GYNECOLOGY
Payer: MEDICAID

## 2017-09-25 ENCOUNTER — HOSPITAL ENCOUNTER (EMERGENCY)
Facility: MEDICAL CENTER | Age: 22
End: 2017-09-25
Payer: MEDICAID

## 2017-09-25 VITALS
HEART RATE: 88 BPM | TEMPERATURE: 98.4 F | RESPIRATION RATE: 16 BRPM | SYSTOLIC BLOOD PRESSURE: 122 MMHG | DIASTOLIC BLOOD PRESSURE: 82 MMHG

## 2017-09-25 LAB
APPEARANCE UR: NORMAL
COLOR UR AUTO: YELLOW
GLUCOSE UR STRIP.AUTO-MCNC: NORMAL MG/DL
KETONES UR STRIP.AUTO-MCNC: NORMAL MG/DL
LEUKOCYTE ESTERASE UR QL STRIP.AUTO: NORMAL
NITRITE UR QL STRIP.AUTO: NORMAL
PH UR STRIP.AUTO: 6.5 [PH] (ref 5–8)
PROT UR QL STRIP: NORMAL MG/DL
RBC UR QL AUTO: NORMAL
SP GR UR STRIP.AUTO: 1.01

## 2017-09-25 PROCEDURE — 59025 FETAL NON-STRESS TEST: CPT | Performed by: OBSTETRICS & GYNECOLOGY

## 2017-09-25 PROCEDURE — 81002 URINALYSIS NONAUTO W/O SCOPE: CPT | Performed by: OBSTETRICS & GYNECOLOGY

## 2017-09-25 NOTE — PROGRESS NOTES
21 y.o.    Edc=  39.4 weeks    TOCO and US on.  VSS.  Pt presents to triage with c/o HA and feeling light headed last night when she awoke in the middle of the night and went to the BR.  Pt states last week she had lab work done because she had protein in her urine and a high BP.  UA done=WNL-see epic.  BPs set for q 10 min.  Pt denies epigastric pain or visual disturbances.  Reflexes=+2, no clonus and abdomen is non tender to touch.  Some edema in LEs.  0825-Report given to Dr. Jamir Noonan-reactive NST, no c/o UCs, UA=WNL, BPs and previous visit.  Orders to dc pt home with general precautions and instructions to follow up at her regularly scheduled appointment

## 2017-09-27 ENCOUNTER — ROUTINE PRENATAL (OUTPATIENT)
Dept: OBGYN | Facility: CLINIC | Age: 22
End: 2017-09-27
Payer: MEDICAID

## 2017-09-27 VITALS — DIASTOLIC BLOOD PRESSURE: 70 MMHG | WEIGHT: 185 LBS | SYSTOLIC BLOOD PRESSURE: 120 MMHG | BODY MASS INDEX: 31.76 KG/M2

## 2017-09-27 DIAGNOSIS — Z34.03 ENCOUNTER FOR SUPERVISION OF NORMAL FIRST PREGNANCY IN THIRD TRIMESTER: ICD-10-CM

## 2017-09-27 PROCEDURE — 90040 PR PRENATAL FOLLOW UP: CPT | Performed by: NURSE PRACTITIONER

## 2017-09-27 NOTE — PROGRESS NOTES
SUBJECTIVE:  Pt is a 21 y.o.   at 39w6d  gestation. Presents today for follow-up prenatal care. Reports no issues at this time.  Reports good  fetal movement. Denies cramping/contractions, bleeding or leaking of fluid. Denies dysuria, headaches, N/V, or other issues at this time. Generally feels well today. Was seen on LnD for suspected preeclampsia, but all labs WNL and patient asymptomatic today.     OBJECTIVE:  - See prenatal vitals flow    Vitals:    17 0844   BP: 120/70   Weight: 83.9 kg (185 lb)      - Pertinent Labs: GBS negative            ASSESSMENT:   - IUP at 39w6d by LMP which is consistent with 19  week US   - S=D  Patient Active Problem List    Diagnosis Date Noted   • Supervision of normal first pregnancy 2017         PLAN:  - IOL ordered today   - S/sx pregnancy and labor warning signs vs general discomforts discussed  - Fetal movements and kick counts reviewed   - Adequate hydration reinforced  - Nutrition/exercise/vitamin education: continued PNV  - Plans for breastfeeding  - Plans implant for contraception PP  - S/p TDAP   - S/p Flu   - Anticipatory guidance given  - RTC in 1 week for follow-up prenatal care

## 2017-09-27 NOTE — PROGRESS NOTES
Pt here for OB/FU Reports Good Fetal Movement.  Pt c/o having irregular U/Cs,denies any other complications  GBS Negative   # [phone]

## 2017-10-04 ENCOUNTER — HOSPITAL ENCOUNTER (INPATIENT)
Facility: MEDICAL CENTER | Age: 22
LOS: 2 days | End: 2017-10-06
Attending: OBSTETRICS & GYNECOLOGY | Admitting: OBSTETRICS & GYNECOLOGY
Payer: MEDICAID

## 2017-10-04 LAB
ALBUMIN SERPL BCP-MCNC: 3.3 G/DL (ref 3.2–4.9)
ALBUMIN/GLOB SERPL: 1 G/DL
ALP SERPL-CCNC: 169 U/L (ref 30–99)
ALT SERPL-CCNC: 8 U/L (ref 2–50)
AMPHET UR QL SCN: NEGATIVE
ANION GAP SERPL CALC-SCNC: 9 MMOL/L (ref 0–11.9)
AST SERPL-CCNC: 15 U/L (ref 12–45)
AST SERPL-CCNC: 16 U/L (ref 12–45)
BARBITURATES UR QL SCN: NEGATIVE
BENZODIAZ UR QL SCN: NEGATIVE
BILIRUB SERPL-MCNC: 0.3 MG/DL (ref 0.1–1.5)
BUN SERPL-MCNC: 10 MG/DL (ref 8–22)
BUN SERPL-MCNC: 9 MG/DL (ref 8–22)
BZE UR QL SCN: NEGATIVE
CALCIUM SERPL-MCNC: 8.9 MG/DL (ref 8.5–10.5)
CANNABINOIDS UR QL SCN: NEGATIVE
CHLORIDE SERPL-SCNC: 105 MMOL/L (ref 96–112)
CO2 SERPL-SCNC: 21 MMOL/L (ref 20–33)
CREAT SERPL-MCNC: 0.68 MG/DL (ref 0.5–1.4)
CREAT SERPL-MCNC: 0.7 MG/DL (ref 0.5–1.4)
ERYTHROCYTE [DISTWIDTH] IN BLOOD BY AUTOMATED COUNT: 40.8 FL (ref 35.9–50)
GFR SERPL CREATININE-BSD FRML MDRD: >60 ML/MIN/1.73 M 2
GFR SERPL CREATININE-BSD FRML MDRD: >60 ML/MIN/1.73 M 2
GLOBULIN SER CALC-MCNC: 3.2 G/DL (ref 1.9–3.5)
GLUCOSE SERPL-MCNC: 74 MG/DL (ref 65–99)
HCT VFR BLD AUTO: 38 % (ref 37–47)
HGB BLD-MCNC: 12.8 G/DL (ref 12–16)
HOLDING TUBE BB 8507: NORMAL
MAGNESIUM SERPL-MCNC: 4.4 MG/DL (ref 1.5–2.5)
MAGNESIUM SERPL-MCNC: 5.9 MG/DL (ref 1.5–2.5)
MCH RBC QN AUTO: 29.4 PG (ref 27–33)
MCHC RBC AUTO-ENTMCNC: 33.7 G/DL (ref 33.6–35)
MCV RBC AUTO: 87.2 FL (ref 81.4–97.8)
METHADONE UR QL SCN: NEGATIVE
OPIATES UR QL SCN: NEGATIVE
OXYCODONE UR QL SCN: NEGATIVE
PCP UR QL SCN: NEGATIVE
PLATELET # BLD AUTO: 176 K/UL (ref 164–446)
PMV BLD AUTO: 12.9 FL (ref 9–12.9)
POTASSIUM SERPL-SCNC: 4 MMOL/L (ref 3.6–5.5)
PROPOXYPH UR QL SCN: NEGATIVE
PROT SERPL-MCNC: 6.5 G/DL (ref 6–8.2)
RBC # BLD AUTO: 4.36 M/UL (ref 4.2–5.4)
SODIUM SERPL-SCNC: 135 MMOL/L (ref 135–145)
URATE SERPL-MCNC: 6 MG/DL (ref 1.9–8.2)
WBC # BLD AUTO: 8.6 K/UL (ref 4.8–10.8)

## 2017-10-04 PROCEDURE — 84450 TRANSFERASE (AST) (SGOT): CPT

## 2017-10-04 PROCEDURE — 80053 COMPREHEN METABOLIC PANEL: CPT

## 2017-10-04 PROCEDURE — 303615 HCHG EPIDURAL/SPINAL ANESTHESIA FOR LABOR

## 2017-10-04 PROCEDURE — 770002 HCHG ROOM/CARE - OB PRIVATE (112)

## 2017-10-04 PROCEDURE — 302128 INFUSION PUMP: Performed by: OBSTETRICS & GYNECOLOGY

## 2017-10-04 PROCEDURE — 700105 HCHG RX REV CODE 258: Performed by: NURSE PRACTITIONER

## 2017-10-04 PROCEDURE — 700111 HCHG RX REV CODE 636 W/ 250 OVERRIDE (IP): Performed by: NURSE PRACTITIONER

## 2017-10-04 PROCEDURE — 84520 ASSAY OF UREA NITROGEN: CPT

## 2017-10-04 PROCEDURE — 59409 OBSTETRICAL CARE: CPT

## 2017-10-04 PROCEDURE — A9270 NON-COVERED ITEM OR SERVICE: HCPCS | Performed by: NURSE PRACTITIONER

## 2017-10-04 PROCEDURE — 84550 ASSAY OF BLOOD/URIC ACID: CPT

## 2017-10-04 PROCEDURE — 83735 ASSAY OF MAGNESIUM: CPT

## 2017-10-04 PROCEDURE — 700111 HCHG RX REV CODE 636 W/ 250 OVERRIDE (IP)

## 2017-10-04 PROCEDURE — 304965 HCHG RECOVERY SERVICES

## 2017-10-04 PROCEDURE — 80307 DRUG TEST PRSMV CHEM ANLYZR: CPT

## 2017-10-04 PROCEDURE — 700105 HCHG RX REV CODE 258: Performed by: OBSTETRICS & GYNECOLOGY

## 2017-10-04 PROCEDURE — 82565 ASSAY OF CREATININE: CPT

## 2017-10-04 PROCEDURE — 700102 HCHG RX REV CODE 250 W/ 637 OVERRIDE(OP): Performed by: NURSE PRACTITIONER

## 2017-10-04 PROCEDURE — 36415 COLL VENOUS BLD VENIPUNCTURE: CPT

## 2017-10-04 PROCEDURE — 85027 COMPLETE CBC AUTOMATED: CPT

## 2017-10-04 PROCEDURE — 700101 HCHG RX REV CODE 250

## 2017-10-04 RX ORDER — CALCIUM GLUCONATE 94 MG/ML
1 INJECTION, SOLUTION INTRAVENOUS PRN
Status: DISCONTINUED | OUTPATIENT
Start: 2017-10-04 | End: 2017-10-05

## 2017-10-04 RX ORDER — MAGNESIUM SULFATE HEPTAHYDRATE 40 MG/ML
2 INJECTION, SOLUTION INTRAVENOUS ONCE
Status: DISCONTINUED | OUTPATIENT
Start: 2017-10-04 | End: 2017-10-05

## 2017-10-04 RX ORDER — OXYCODONE HYDROCHLORIDE AND ACETAMINOPHEN 5; 325 MG/1; MG/1
1 TABLET ORAL EVERY 4 HOURS PRN
Status: DISCONTINUED | OUTPATIENT
Start: 2017-10-04 | End: 2017-10-06 | Stop reason: HOSPADM

## 2017-10-04 RX ORDER — DEXTROSE, SODIUM CHLORIDE, SODIUM LACTATE, POTASSIUM CHLORIDE, AND CALCIUM CHLORIDE 5; .6; .31; .03; .02 G/100ML; G/100ML; G/100ML; G/100ML; G/100ML
INJECTION, SOLUTION INTRAVENOUS CONTINUOUS
Status: DISCONTINUED | OUTPATIENT
Start: 2017-10-04 | End: 2017-10-05

## 2017-10-04 RX ORDER — MAGNESIUM SULFATE HEPTAHYDRATE 40 MG/ML
INJECTION, SOLUTION INTRAVENOUS
Status: COMPLETED
Start: 2017-10-04 | End: 2017-10-04

## 2017-10-04 RX ORDER — ONDANSETRON 4 MG/1
4 TABLET, ORALLY DISINTEGRATING ORAL EVERY 6 HOURS PRN
Status: DISCONTINUED | OUTPATIENT
Start: 2017-10-04 | End: 2017-10-06 | Stop reason: HOSPADM

## 2017-10-04 RX ORDER — ALUMINA, MAGNESIA, AND SIMETHICONE 2400; 2400; 240 MG/30ML; MG/30ML; MG/30ML
30 SUSPENSION ORAL EVERY 6 HOURS PRN
Status: DISCONTINUED | OUTPATIENT
Start: 2017-10-04 | End: 2017-10-05 | Stop reason: HOSPADM

## 2017-10-04 RX ORDER — BUPIVACAINE HYDROCHLORIDE 2.5 MG/ML
INJECTION, SOLUTION EPIDURAL; INFILTRATION; INTRACAUDAL
Status: ACTIVE
Start: 2017-10-04 | End: 2017-10-05

## 2017-10-04 RX ORDER — MAGNESIUM SULFATE HEPTAHYDRATE 40 MG/ML
4 INJECTION, SOLUTION INTRAVENOUS ONCE
Status: COMPLETED | OUTPATIENT
Start: 2017-10-04 | End: 2017-10-04

## 2017-10-04 RX ORDER — CARBOPROST TROMETHAMINE 250 UG/ML
INJECTION, SOLUTION INTRAMUSCULAR
Status: COMPLETED
Start: 2017-10-04 | End: 2017-10-04

## 2017-10-04 RX ORDER — SODIUM CHLORIDE, SODIUM LACTATE, POTASSIUM CHLORIDE, CALCIUM CHLORIDE 600; 310; 30; 20 MG/100ML; MG/100ML; MG/100ML; MG/100ML
INJECTION, SOLUTION INTRAVENOUS CONTINUOUS
Status: DISCONTINUED | OUTPATIENT
Start: 2017-10-04 | End: 2017-10-05

## 2017-10-04 RX ORDER — IBUPROFEN 600 MG/1
600 TABLET ORAL EVERY 6 HOURS PRN
Status: DISCONTINUED | OUTPATIENT
Start: 2017-10-04 | End: 2017-10-06 | Stop reason: HOSPADM

## 2017-10-04 RX ORDER — MAGNESIUM SULFATE HEPTAHYDRATE 40 MG/ML
2 INJECTION, SOLUTION INTRAVENOUS CONTINUOUS
Status: DISCONTINUED | OUTPATIENT
Start: 2017-10-04 | End: 2017-10-05

## 2017-10-04 RX ORDER — SODIUM CHLORIDE, SODIUM LACTATE, POTASSIUM CHLORIDE, CALCIUM CHLORIDE 600; 310; 30; 20 MG/100ML; MG/100ML; MG/100ML; MG/100ML
INJECTION, SOLUTION INTRAVENOUS CONTINUOUS
Status: ACTIVE | OUTPATIENT
Start: 2017-10-04 | End: 2017-10-04

## 2017-10-04 RX ORDER — OXYCODONE HYDROCHLORIDE AND ACETAMINOPHEN 5; 325 MG/1; MG/1
2 TABLET ORAL EVERY 4 HOURS PRN
Status: DISCONTINUED | OUTPATIENT
Start: 2017-10-04 | End: 2017-10-06 | Stop reason: HOSPADM

## 2017-10-04 RX ORDER — ROPIVACAINE HYDROCHLORIDE 2 MG/ML
INJECTION, SOLUTION EPIDURAL; INFILTRATION; PERINEURAL
Status: COMPLETED
Start: 2017-10-04 | End: 2017-10-04

## 2017-10-04 RX ORDER — ONDANSETRON 2 MG/ML
4 INJECTION INTRAMUSCULAR; INTRAVENOUS EVERY 6 HOURS PRN
Status: DISCONTINUED | OUTPATIENT
Start: 2017-10-04 | End: 2017-10-06 | Stop reason: HOSPADM

## 2017-10-04 RX ORDER — MISOPROSTOL 200 UG/1
800 TABLET ORAL
Status: COMPLETED | OUTPATIENT
Start: 2017-10-04 | End: 2017-10-04

## 2017-10-04 RX ADMIN — MISOPROSTOL 800 MCG: 200 TABLET ORAL at 23:40

## 2017-10-04 RX ADMIN — FENTANYL CITRATE 100 MCG: 50 INJECTION, SOLUTION INTRAMUSCULAR; INTRAVENOUS at 09:07

## 2017-10-04 RX ADMIN — SODIUM CHLORIDE, POTASSIUM CHLORIDE, SODIUM LACTATE AND CALCIUM CHLORIDE: 600; 310; 30; 20 INJECTION, SOLUTION INTRAVENOUS at 14:00

## 2017-10-04 RX ADMIN — ONDANSETRON 4 MG: 2 INJECTION INTRAMUSCULAR; INTRAVENOUS at 18:18

## 2017-10-04 RX ADMIN — ROPIVACAINE HYDROCHLORIDE 100 ML: 2 INJECTION, SOLUTION EPIDURAL; INFILTRATION at 14:21

## 2017-10-04 RX ADMIN — OXYTOCIN 1 MILLI-UNITS/MIN: 10 INJECTION, SOLUTION INTRAMUSCULAR; INTRAVENOUS at 08:59

## 2017-10-04 RX ADMIN — ONDANSETRON 4 MG: 4 TABLET, ORALLY DISINTEGRATING ORAL at 17:24

## 2017-10-04 RX ADMIN — FENTANYL CITRATE 100 MCG: 50 INJECTION, SOLUTION INTRAMUSCULAR; INTRAVENOUS at 11:45

## 2017-10-04 RX ADMIN — MAGNESIUM SULFATE IN WATER 4 G: 40 INJECTION, SOLUTION INTRAVENOUS at 06:30

## 2017-10-04 RX ADMIN — SODIUM CHLORIDE, SODIUM LACTATE, POTASSIUM CHLORIDE, CALCIUM CHLORIDE AND DEXTROSE MONOHYDRATE: 5; 600; 310; 30; 20 INJECTION, SOLUTION INTRAVENOUS at 19:08

## 2017-10-04 RX ADMIN — SODIUM CHLORIDE, POTASSIUM CHLORIDE, SODIUM LACTATE AND CALCIUM CHLORIDE: 600; 310; 30; 20 INJECTION, SOLUTION INTRAVENOUS at 06:15

## 2017-10-04 RX ADMIN — MAGNESIUM SULFATE IN WATER 2 G/HR: 40 INJECTION, SOLUTION INTRAVENOUS at 06:59

## 2017-10-04 RX ADMIN — MAGNESIUM SULFATE IN WATER 2 G/HR: 40 INJECTION, SOLUTION INTRAVENOUS at 17:28

## 2017-10-04 RX ADMIN — CARBOPROST TROMETHAMINE 250 MCG: 250 INJECTION, SOLUTION INTRAMUSCULAR at 23:50

## 2017-10-04 ASSESSMENT — LIFESTYLE VARIABLES
ALCOHOL_USE: NO
DO YOU DRINK ALCOHOL: NO
EVER_SMOKED: NEVER

## 2017-10-04 NOTE — PROGRESS NOTES
Received report and assumed care of patient from MARY Park. Patient is visibly uncomfortable but does not want intervention at this time. POC reviewed, questions answered, needs met at this time.  1200 SVE 5/-1  1315 SVE 5-6/-1  1444 AROM with clear fluid, SVE 5-6/-1  1745 SVE 9/100/0  1920 Will begin pushing  2045 patient is becoming progressively more tired. Leena bedside to evaluate. Will rest and labor down for 40 minutes and resume pushing  2327 , possible third degree laceration, Dr. Horn assessed and conformed second degree, hemabate and cytotec administered for uterine atony secondary to magnesium sulfate (see MAR). Patient is nonsymptomatic to blood loss at this time.   0000 Report given to JENY Khan and assumed care of patient.

## 2017-10-04 NOTE — CARE PLAN
Problem: Pain  Goal: Alleviation of Pain or a reduction in pain to the patient's comfort goal  Outcome: PROGRESSING AS EXPECTED  Pain assessment, medication as needed

## 2017-10-04 NOTE — PROGRESS NOTES
0620- Report from MIAN Bunn RN. Patient transferred to labor room. Orders for magnesium received. Mag started per MAR. Patient educated. Admission procedures completed. Patient denies HA, but is hyperreflexive. No clonus noted. Patient states she is not sure if she wants an epidural. Educated patient on epidural process.    0700- Report to EVAN Vail RN. POC discussed.

## 2017-10-04 NOTE — H&P
"History and Physical      Kadi Avendano is a 21 y.o. female  at 40w6d who presents for active labor and elevated BP - suspect preeclampsia. Labs/UA pending.    Subjective:   positive  For CTXS.   positive Feels pain   negative for LOF  negative for vaginal bleeding.   positive for fetal movement    ROS: Pertinent items are noted in HPI.    Past Medical History:   Diagnosis Date   • Anemia      No past surgical history on file.  OB History    Para Term  AB Living   1             SAB TAB Ectopic Molar Multiple Live Births                    # Outcome Date GA Lbr Silvino/2nd Weight Sex Delivery Anes PTL Lv   1 Current                 Social History     Social History   • Marital status: Single     Spouse name: N/A   • Number of children: N/A   • Years of education: N/A     Occupational History   • Not on file.     Social History Main Topics   • Smoking status: Never Smoker   • Smokeless tobacco: Never Used   • Alcohol use No   • Drug use: No   • Sexual activity: Yes     Partners: Male      Comment: none. unplanned pregnancy     Other Topics Concern   • Not on file     Social History Narrative   • No narrative on file     Allergies: Review of patient's allergies indicates no known allergies.    Current Facility-Administered Medications:   •  lactated ringers infusion, , Intravenous, Continuous, ALCON Temple.N.P.  •  MAGNESIUM SULFATE 4 GM/100ML IV SOLN, , , ,     Prenatal care with [facility] starting at 9 5/7 week gestation with following problems:  Patient Active Problem List    Diagnosis Date Noted   • Supervision of normal first pregnancy 2017               Objective:      Blood pressure (!) 164/107, pulse 72, height 1.651 m (5' 5\"), weight 83.5 kg (184 lb).    General:   no acute distress   Skin:   normal   HEENT:  PERRLA   Lungs:   CTA bilateral   Heart:   S1, S2 normal, peripheral pulses very brisk   Abdomen:   gravid, NT   EFW:  3500   Pelvis:  adequate with gynecoid pelvis "   FHT:  145 BPM   Uterine Size: S=D   Presentations: Cephalic   Cervix:     Dilation: 4 cm    Effacement: 70    Station:  -2    Consistency: Medium    Position: Posterior     Lab Review  Lab:   Blood type: O     Recent Results (from the past 5880 hour(s))   POCT Pregnancy    Collection Time: 02/06/17  9:25 AM   Result Value Ref Range    POC Urine Pregnancy Test Positive Negative    Internal Control Positive Positive     Internal Control Negative Negative    POCT US - In Clinic OB Scan    Collection Time: 02/28/17  9:26 AM   Result Value Ref Range    In Clinic OB Scan     POCT Urinalysis    Collection Time: 04/12/17  8:20 AM   Result Value Ref Range    POC Color  Negative    POC Appearance  Negative    POC Leukocyte Esterase MODERATE Negative    POC Nitrites NEGATIVE Negative    POC Urobiligen  Negative (0.2) mg/dL    POC Protein NEGATIVE Negative mg/dL    POC Urine PH 6.0 5.0 - 8.0    POC Blood NEGATIVE Negative    POC Specific Gravity 1.020 <1.005 - >1.030    POC Ketones NEGATIVE Negative mg/dL    POC Biliruben  Negative mg/dL    POC Glucose NEGATIVE Negative mg/dL   THINPREP RFLX HPV ASCUS W/CTNG    Collection Time: 04/12/17  9:16 AM   Result Value Ref Range    Cytology Reg See Path Report     Source Cervical     C. trachomatis by PCR Negative Negative    N. gonorrhoeae by PCR Negative Negative   PREG CNTR PRENATAL PN    Collection Time: 04/21/17  8:46 AM   Result Value Ref Range    WBC 6.8 4.8 - 10.8 K/uL    RBC 4.93 4.20 - 5.40 M/uL    Hemoglobin 14.3 12.0 - 16.0 g/dL    Hematocrit 42.3 37.0 - 47.0 %    MCV 85.8 81.4 - 97.8 fL    MCH 29.0 27.0 - 33.0 pg    MCHC 33.8 33.6 - 35.0 g/dL    RDW 41.4 35.9 - 50.0 fL    Platelet Count 240 164 - 446 K/uL    MPV 11.5 9.0 - 12.9 fL    Neutrophils-Polys 64.50 44.00 - 72.00 %    Lymphocytes 27.40 22.00 - 41.00 %    Monocytes 6.50 0.00 - 13.40 %    Eosinophils 0.30 0.00 - 6.90 %    Basophils 0.40 0.00 - 1.80 %    Immature Granulocytes 0.90 0.00 - 0.90 %    Nucleated RBC 0.00  /100 WBC    Neutrophils (Absolute) 4.35 2.00 - 7.15 K/uL    Lymphs (Absolute) 1.85 1.00 - 4.80 K/uL    Monos (Absolute) 0.44 0.00 - 0.85 K/uL    Eos (Absolute) 0.02 0.00 - 0.51 K/uL    Baso (Absolute) 0.03 0.00 - 0.12 K/uL    Immature Granulocytes (abs) 0.06 0.00 - 0.11 K/uL    NRBC (Absolute) 0.00 K/uL    Rubella IgG Antibody 23.80 IU/mL    Syphilis, Treponemal Qual Non Reactive Non Reactive    Hepatitis B Surface Antigen Negative Negative    Micro Urine Req Microscopic     Color Yellow     Character Sl Cloudy (A)     Specific Gravity 1.017 <1.035    Ph 6.5 5.0 - 8.0    Glucose Negative Negative mg/dL    Ketones Negative Negative mg/dL    Protein Negative Negative mg/dL    Bilirubin Negative Negative    Nitrite Negative Negative    Leukocyte Esterase Trace (A) Negative    Occult Blood Negative Negative    Culture Indicated Yes UA Culture   AFP MATERNAL SERUM ALPHA-FETOPROTEIN    Collection Time: 04/21/17  8:46 AM   Result Value Ref Range    AFP Value -Eia 29 ng/mL    AFP MOM Value 0.73     Interpretation Normal     Insulin Dependent Diabetes No     M[age] VICTORIA 21[age]7 yr    Gestational Age Based On US     Gestational Age 17.14 weeks    Maternal Weight 141 lbs    Race Other     Multiple Pregnancy One    HIV ANTIBODIES    Collection Time: 04/21/17  8:46 AM   Result Value Ref Range    HIV Ag/Ab Combo Assay Non Reactive Non Reactive   OP PRENATAL PANEL-BLOOD BANK    Collection Time: 04/21/17  8:46 AM   Result Value Ref Range    ABO Grouping Only O     Rh Grouping Only POS     Antibody Screen Scrn NEG    URINE MICROSCOPIC (W/UA)    Collection Time: 04/21/17  8:46 AM   Result Value Ref Range    WBC 0-2 /hpf    RBC 2-5 (A) /hpf    Epithelial Cells Few /hpf    Mucous Threads Few /hpf    Sperm Present /hpf   URINE CULTURE(NEW)    Collection Time: 04/21/17  8:46 AM   Result Value Ref Range    Significant Indicator POS     Source UR     Urine Culture Mixed skin francisca 10-50,000 cfu/mL (A)     Urine Culture (A)       Probable Gardnerella vaginalis  ,000 cfu/mL     AFP TETRA    Collection Time: 05/08/17  9:16 AM   Result Value Ref Range    AFP Value -Eia 38 ng/mL    AFP MOM Value 0.69     Hcg Value 8,042 IU/L    Hcg Mom 0.41     Ue3 Value 2.59 ng/mL    Ue3 Mom 1.28     Interpretation Normal     Maternal [age] at VICTORIA 21[age]7 yr    Gestational Age Based On Unknown     Gestational Age 19.57 weeks    Insulin Dependent Diabetes No     Race Other     Multiple Pregnancy One     Kait Value -Eia 159 pg/mL    Kait Mom Value 0.92     Maternal Weight 145 lbs    Err Maternal Scrn AFP See Note    GLUCOSE 1HR GESTATIONAL    Collection Time: 06/09/17  9:43 AM   Result Value Ref Range    Glucose, Post Dose 78 70 - 139 mg/dL   HCT    Collection Time: 06/09/17  9:43 AM   Result Value Ref Range    Hematocrit 40.5 37.0 - 47.0 %   HGB    Collection Time: 06/09/17  9:43 AM   Result Value Ref Range    Hemoglobin 13.3 12.0 - 16.0 g/dL   T.PALLIDUM AB EIA    Collection Time: 06/09/17  9:43 AM   Result Value Ref Range    Syphilis, Treponemal Qual Non Reactive Non Reactive   GRP B STREP, BY PCR ([facility]A BROTH)    Collection Time: 09/05/17 10:40 AM   Result Value Ref Range    Strep Gp B DNA PCR Negative Negative   POCT Urinalysis    Collection Time: 09/18/17  9:05 AM   Result Value Ref Range    POC Color  Negative    POC Appearance  Negative    POC Leukocyte Esterase moderate Negative    POC Nitrites negative Negative    POC Urobiligen  Negative (0.2) mg/dL    POC Protein 2+ Negative mg/dL    POC Urine PH 6.5 5.0 - 8.0    POC Blood negative Negative    POC Specific Gravity 1.025 <1.005 - >1.030    POC Ketones negative Negative mg/dL    POC Biliruben  Negative mg/dL    POC Glucose negative Negative mg/dL   POCT NST    Collection Time: 09/18/17 10:30 AM   Result Value Ref Range    NST Indications Serial BPs     NST Baseline 140s     NST Uterine Activity One UC     NST Acoustic Stimulation None     NST Assessment       Reactive NST cat I FHTs +accels -decels mod  variability    NST Action Necessary      NST Other Data Serial BPs 120s/70s     NST Return 1wk JULIET     NST Read By [facility] [place], [place]    URINETOTAL PROTEIN 24 HR    Collection Time: 09/19/17  1:23 PM   Result Value Ref Range    Total Protein, Urine 6.4 0.0 - 15.0 mg/dL    Total Volume, Urine 3,500 mL    Total Protein, 24 Hour Urine 224.0 (H) 30.0 - 150.0 mg/24 Hr   CREATININE CLEARANCE    Collection Time: 09/19/17  1:45 PM   Result Value Ref Range    Collection Period, Urine 24 hr    Creatinine Plasma, Creatinine 0.57 0.50 - 1.40 mg/dL    Creatinine, Urine 49.90 mg/dL    Weight - Creatinine Clearance 82.1 kg    Height - Creatinine Clearance 163 cm    Creatine Excreated 1,747 mg/24 hr    Creat.Clearance 196 (H) 88 - 128 mL/min    Total Volume, Urine 3,500 mL   COMP METABOLIC PANEL    Collection Time: 09/19/17  1:47 PM   Result Value Ref Range    Sodium 136 135 - 145 mmol/L    Potassium 4.0 3.6 - 5.5 mmol/L    Chloride 106 96 - 112 mmol/L    Co2 20 20 - 33 mmol/L    Anion Gap 10.0 0.0 - 11.9    Glucose 103 (H) 65 - 99 mg/dL    Bun 9 8 - 22 mg/dL    Creatinine 0.58 0.50 - 1.40 mg/dL    Calcium 9.0 8.5 - 10.5 mg/dL    AST(SGOT) 17 12 - 45 U/L    ALT(SGPT) 14 2 - 50 U/L    Alkaline Phosphatase 166 (H) 30 - 99 U/L    Total Bilirubin 0.3 0.1 - 1.5 mg/dL    Albumin 3.2 3.2 - 4.9 g/dL    Total Protein 6.5 6.0 - 8.2 g/dL    Globulin 3.3 1.9 - 3.5 g/dL    A-G Ratio 1.0 g/dL   URIC ACID    Collection Time: 09/19/17  1:47 PM   Result Value Ref Range    Uric Acid 5.0 1.9 - 8.2 mg/dL   CBC WITH DIFFERENTIAL    Collection Time: 09/19/17  1:47 PM   Result Value Ref Range    WBC 6.6 4.8 - 10.8 K/uL    RBC 4.35 4.20 - 5.40 M/uL    Hemoglobin 12.7 12.0 - 16.0 g/dL    Hematocrit 38.4 37.0 - 47.0 %    MCV 88.3 81.4 - 97.8 fL    MCH 29.2 27.0 - 33.0 pg    MCHC 33.1 (L) 33.6 - 35.0 g/dL    RDW 41.7 35.9 - 50.0 fL    Platelet Count 174 164 - 446 K/uL    MPV 13.1 (H) 9.0 - 12.9 fL    Neutrophils-Polys 72.80 (H) 44.00 - 72.00 %    Lymphocytes 16.90 (L)  22.00 - 41.00 %    Monocytes 8.50 0.00 - 13.40 %    Eosinophils 0.20 0.00 - 6.90 %    Basophils 0.20 0.00 - 1.80 %    Immature Granulocytes 1.40 (H) 0.00 - 0.90 %    Nucleated RBC 0.00 /100 WBC    Neutrophils (Absolute) 4.79 2.00 - 7.15 K/uL    Lymphs (Absolute) 1.11 1.00 - 4.80 K/uL    Monos (Absolute) 0.56 0.00 - 0.85 K/uL    Eos (Absolute) 0.01 0.00 - 0.51 K/uL    Baso (Absolute) 0.01 0.00 - 0.12 K/uL    Immature Granulocytes (abs) 0.09 0.00 - 0.11 K/uL    NRBC (Absolute) 0.00 K/uL   ESTIMATED GFR    Collection Time: 09/19/17  1:47 PM   Result Value Ref Range    GFR If African American >60 >60 mL/min/1.73 m 2    GFR If Non African American >60 >60 mL/min/1.73 m 2   POC URINALYSIS    Collection Time: 09/25/17  8:00 AM   Result Value Ref Range    POC Color yellow Negative    POC Appearance hazy Negative    POC Glucose neg Negative mg/dL    POC Ketones neg Negative mg/dL    POC Specific Gravity 1.015 <1.005 - >1.030    POC Blood neg Negative    POC Urine PH 6.5 5.0 - 8.0    POC Protein neg Negative mg/dL    POC Nitrites neg Negative    POC Leukocyte Esterase neg Negative        Assessment:   Kadi Avendano at 40w6d  Labor status: Active phase labor.  Obstetrical history significant for   Patient Active Problem List    Diagnosis Date Noted   • Supervision of normal first pregnancy 04/12/2017   .      Plan:     Admit to L&D, GBS negative. Admit for active labor and suspected preeclampsia. Magnesium Sulfate Start up.    Serial /107, 160/109, 151/106

## 2017-10-04 NOTE — CARE PLAN
Problem: Infection  Goal: Will remain free from infection  Outcome: PROGRESSING AS EXPECTED  Monitor for S&S of infection such as fever

## 2017-10-05 LAB
BASOPHILS # BLD AUTO: 0.6 % (ref 0–1.8)
BASOPHILS # BLD: 0.14 K/UL (ref 0–0.12)
EOSINOPHIL # BLD AUTO: 0 K/UL (ref 0–0.51)
EOSINOPHIL NFR BLD: 0 % (ref 0–6.9)
ERYTHROCYTE [DISTWIDTH] IN BLOOD BY AUTOMATED COUNT: 43.1 FL (ref 35.9–50)
HCT VFR BLD AUTO: 27.8 % (ref 37–47)
HGB BLD-MCNC: 9.2 G/DL (ref 12–16)
IMM GRANULOCYTES # BLD AUTO: 0.2 K/UL (ref 0–0.11)
IMM GRANULOCYTES NFR BLD AUTO: 0.8 % (ref 0–0.9)
LYMPHOCYTES # BLD AUTO: 0.75 K/UL (ref 1–4.8)
LYMPHOCYTES NFR BLD: 3.1 % (ref 22–41)
MAGNESIUM SERPL-MCNC: 6 MG/DL (ref 1.5–2.5)
MCH RBC QN AUTO: 29.5 PG (ref 27–33)
MCHC RBC AUTO-ENTMCNC: 33.1 G/DL (ref 33.6–35)
MCV RBC AUTO: 89.1 FL (ref 81.4–97.8)
MONOCYTES # BLD AUTO: 1.4 K/UL (ref 0–0.85)
MONOCYTES NFR BLD AUTO: 5.8 % (ref 0–13.4)
NEUTROPHILS # BLD AUTO: 21.63 K/UL (ref 2–7.15)
NEUTROPHILS NFR BLD: 89.7 % (ref 44–72)
NRBC # BLD AUTO: 0 K/UL
NRBC BLD AUTO-RTO: 0 /100 WBC
PLATELET # BLD AUTO: 174 K/UL (ref 164–446)
PMV BLD AUTO: 12.5 FL (ref 9–12.9)
RBC # BLD AUTO: 3.12 M/UL (ref 4.2–5.4)
WBC # BLD AUTO: 24.1 K/UL (ref 4.8–10.8)

## 2017-10-05 PROCEDURE — 770002 HCHG ROOM/CARE - OB PRIVATE (112)

## 2017-10-05 PROCEDURE — 10907ZC DRAINAGE OF AMNIOTIC FLUID, THERAPEUTIC FROM PRODUCTS OF CONCEPTION, VIA NATURAL OR ARTIFICIAL OPENING: ICD-10-PCS | Performed by: OBSTETRICS & GYNECOLOGY

## 2017-10-05 PROCEDURE — 0KQM0ZZ REPAIR PERINEUM MUSCLE, OPEN APPROACH: ICD-10-PCS | Performed by: OBSTETRICS & GYNECOLOGY

## 2017-10-05 PROCEDURE — 700102 HCHG RX REV CODE 250 W/ 637 OVERRIDE(OP): Performed by: NURSE PRACTITIONER

## 2017-10-05 PROCEDURE — 700105 HCHG RX REV CODE 258: Performed by: NURSE PRACTITIONER

## 2017-10-05 PROCEDURE — 700111 HCHG RX REV CODE 636 W/ 250 OVERRIDE (IP): Performed by: NURSE PRACTITIONER

## 2017-10-05 PROCEDURE — 3E0P3VZ INTRODUCTION OF HORMONE INTO FEMALE REPRODUCTIVE, PERCUTANEOUS APPROACH: ICD-10-PCS | Performed by: OBSTETRICS & GYNECOLOGY

## 2017-10-05 PROCEDURE — 4A1HXCZ MONITORING OF PRODUCTS OF CONCEPTION, CARDIAC RATE, EXTERNAL APPROACH: ICD-10-PCS | Performed by: OBSTETRICS & GYNECOLOGY

## 2017-10-05 PROCEDURE — A9270 NON-COVERED ITEM OR SERVICE: HCPCS | Performed by: NURSE PRACTITIONER

## 2017-10-05 PROCEDURE — 36415 COLL VENOUS BLD VENIPUNCTURE: CPT

## 2017-10-05 PROCEDURE — 85025 COMPLETE CBC W/AUTO DIFF WBC: CPT

## 2017-10-05 PROCEDURE — 83735 ASSAY OF MAGNESIUM: CPT

## 2017-10-05 RX ORDER — SODIUM CHLORIDE, SODIUM LACTATE, POTASSIUM CHLORIDE, CALCIUM CHLORIDE 600; 310; 30; 20 MG/100ML; MG/100ML; MG/100ML; MG/100ML
INJECTION, SOLUTION INTRAVENOUS
Status: COMPLETED
Start: 2017-10-05 | End: 2017-10-05

## 2017-10-05 RX ORDER — CARBOPROST TROMETHAMINE 250 UG/ML
250 INJECTION, SOLUTION INTRAMUSCULAR
Status: DISCONTINUED | OUTPATIENT
Start: 2017-10-05 | End: 2017-10-06 | Stop reason: HOSPADM

## 2017-10-05 RX ORDER — SODIUM CHLORIDE, SODIUM LACTATE, POTASSIUM CHLORIDE, CALCIUM CHLORIDE 600; 310; 30; 20 MG/100ML; MG/100ML; MG/100ML; MG/100ML
INJECTION, SOLUTION INTRAVENOUS CONTINUOUS
Status: DISCONTINUED | OUTPATIENT
Start: 2017-10-05 | End: 2017-10-05

## 2017-10-05 RX ORDER — MAGNESIUM SULFATE HEPTAHYDRATE 40 MG/ML
2 INJECTION, SOLUTION INTRAVENOUS ONCE
Status: DISCONTINUED | OUTPATIENT
Start: 2017-10-05 | End: 2017-10-05

## 2017-10-05 RX ORDER — SODIUM CHLORIDE, SODIUM LACTATE, POTASSIUM CHLORIDE, CALCIUM CHLORIDE 600; 310; 30; 20 MG/100ML; MG/100ML; MG/100ML; MG/100ML
INJECTION, SOLUTION INTRAVENOUS PRN
Status: DISCONTINUED | OUTPATIENT
Start: 2017-10-05 | End: 2017-10-06 | Stop reason: HOSPADM

## 2017-10-05 RX ORDER — MISOPROSTOL 200 UG/1
600 TABLET ORAL
Status: DISCONTINUED | OUTPATIENT
Start: 2017-10-05 | End: 2017-10-06 | Stop reason: HOSPADM

## 2017-10-05 RX ORDER — VITAMIN A ACETATE, BETA CAROTENE, ASCORBIC ACID, CHOLECALCIFEROL, .ALPHA.-TOCOPHEROL ACETATE, DL-, THIAMINE MONONITRATE, RIBOFLAVIN, NIACINAMIDE, PYRIDOXINE HYDROCHLORIDE, FOLIC ACID, CYANOCOBALAMIN, CALCIUM CARBONATE, FERROUS FUMARATE, ZINC OXIDE, CUPRIC OXIDE 3080; 12; 120; 400; 1; 1.84; 3; 20; 22; 920; 25; 200; 27; 10; 2 [IU]/1; UG/1; MG/1; [IU]/1; MG/1; MG/1; MG/1; MG/1; MG/1; [IU]/1; MG/1; MG/1; MG/1; MG/1; MG/1
1 TABLET, FILM COATED ORAL EVERY MORNING
Status: DISCONTINUED | OUTPATIENT
Start: 2017-10-05 | End: 2017-10-06 | Stop reason: HOSPADM

## 2017-10-05 RX ORDER — DOCUSATE SODIUM 100 MG/1
100 CAPSULE, LIQUID FILLED ORAL 2 TIMES DAILY PRN
Status: DISCONTINUED | OUTPATIENT
Start: 2017-10-05 | End: 2017-10-06 | Stop reason: HOSPADM

## 2017-10-05 RX ADMIN — OXYCODONE HYDROCHLORIDE AND ACETAMINOPHEN 1 TABLET: 5; 325 TABLET ORAL at 23:50

## 2017-10-05 RX ADMIN — OXYCODONE HYDROCHLORIDE AND ACETAMINOPHEN 1 TABLET: 5; 325 TABLET ORAL at 19:15

## 2017-10-05 RX ADMIN — SODIUM CHLORIDE, POTASSIUM CHLORIDE, SODIUM LACTATE AND CALCIUM CHLORIDE: 600; 310; 30; 20 INJECTION, SOLUTION INTRAVENOUS at 01:45

## 2017-10-05 RX ADMIN — IBUPROFEN 600 MG: 600 TABLET, FILM COATED ORAL at 16:36

## 2017-10-05 RX ADMIN — IBUPROFEN 600 MG: 600 TABLET, FILM COATED ORAL at 06:04

## 2017-10-05 RX ADMIN — IBUPROFEN 600 MG: 600 TABLET, FILM COATED ORAL at 23:50

## 2017-10-05 RX ADMIN — OXYCODONE HYDROCHLORIDE AND ACETAMINOPHEN 1 TABLET: 5; 325 TABLET ORAL at 08:12

## 2017-10-05 RX ADMIN — OXYTOCIN 125 ML/HR: 10 INJECTION, SOLUTION INTRAMUSCULAR; INTRAVENOUS at 01:40

## 2017-10-05 ASSESSMENT — PAIN SCALES - GENERAL
PAINLEVEL_OUTOF10: 5
PAINLEVEL_OUTOF10: 8
PAINLEVEL_OUTOF10: 6
PAINLEVEL_OUTOF10: 2
PAINLEVEL_OUTOF10: 5
PAINLEVEL_OUTOF10: 4
PAINLEVEL_OUTOF10: 2
PAINLEVEL_OUTOF10: 2
PAINLEVEL_OUTOF10: 3

## 2017-10-05 NOTE — PROGRESS NOTES
0700- Report received from JENY Khan RN. Pt resting.  0720- food ordered to be sent to rm 335.  0740- corey removed.  0745- pt transferred to room 335 with baby, report given to JENY Rivera RN.

## 2017-10-05 NOTE — PROGRESS NOTES
H&H 9.2/27.8 platelets 174. Pt is resting well now, no active bleeding now and b/p's are stable 114/72

## 2017-10-05 NOTE — CARE PLAN
Problem: Altered physiologic condition related to immediate post-delivery state and potential for bleeding/hemorrhage  Goal: Patient physiologically stable as evidenced by normal lochia, palpable uterine involution and vital signs within normal limits  Outcome: PROGRESSING AS EXPECTED  Lochia wnl and fundus firm.     Problem: Potential knowledge deficit related to lack of understanding of self and  care  Goal: Patient will verbalize understanding of self and infant care  Outcome: PROGRESSING AS EXPECTED  Pt verbalizing understanding of self and infant - will continue to educate.

## 2017-10-05 NOTE — DELIVERY NOTE
Delivery Note    PATIENT ID:  NAME:  Kadi Avendano  MRN:               [id number]  YOB: 1995    Labor Course  Pt was admitted for IOL for preeclampsia and 40 weeks and 6 days.  Pt Pt was on MGSO4 2 grams for B/P control and Pitocin.  Pt progressed to complete at 1845, was allowed to labor down and started to push around 1926, she pushed semi effectively for an our but became so exhausted we rested for 40 minutes and then started pushing again. Cat 1 strip through all of labor and most of pushing mild variables noted. Pt was afebrile through out B/P's reached 168/105 while pushing but they came back down after delivery to 140's/90's.      On 10/5/2017  at 2327, this 21 y.o.,  41w0d now   , GBS negative female delivered via OA/ under epidural anesthesia a viable female infant weighing 7 lbs and 4 oz 3295 with APGAR scores of 8 and 9 at one and five minutes.   Baby to maternal abdomen.  Spontaneous cry at 20 seconds, there was terminal meconium noted and the fluid that came out with baby was a green as well.  Mouth and nares bulb suctioned by RN. Delayed cord clamping occurred with cord doubly clamped by myself and cut by Father, after pulsations had stopped. RT was present for delivery due to MGSO4 on board for several hours. Pitocin infusing in IVF W/O.  Spontaneous delivery of dylan placenta grossly intact @ 2334,  CVx3. Fundus is boggy in the beginning, firms with IV Pitocin, but bleeding remained heavy, 800 mcg Cytotec was placed rectally, and waited about 3 minutes bleeding continues, hemabate 250 mcg IM given. Fundus finally starts to firm. Clots expressed approx 300 cc.  Upon vaginal exam, there was 2nd vaginal and perineal  laceration which was repaired using 3.0 Chromic in the usual sterile fashion. Pt and infant are stable and bonding.  Lap count: 20.  Estimated blood loss: 600cc.  MGSO4 running at 2 grams/hr, To run for 12 hours PP per Dr HORN. I had Dr Horn come into look at  vaginal/pernieal laceration as I wanted to make sure capsule intact as when placing Cytotec the rectovag wall was extremely thin.    Joyce Karimi, LINDA, APRN  Dr. Horn attending physician

## 2017-10-05 NOTE — PROGRESS NOTES
[facility] LABOR AND DELIVERY PROGRESS NOTE    PATIENT ID:  NAME:  Kdai Avendano  MRN:               [id number]  YOB: 1995     21 y.o. female  at 40w6d.    Subjective:  Pt is starting to feel a bit of pressure around    Still fairly comfortable with her epidural      Objective:    Vitals:    10/04/17 1700 10/04/17 1730 10/04/17 1800 10/04/17 1830   BP: 132/78 138/93 148/96 140/93   Pulse: 90 96 100 (!) 102   Temp:       SpO2: 98% 97% 97% 93%   Weight:       Height:       Lungs clear, DTR's are +1, legs and feet +1 edema   Heart s1s2 with out Murmur   abd soft in between u/C's  MGSO4 at 2 gms per hour   Pit at 13     Cervix:  Cokmpletcm/100%/+1   [place]: Uterine Contractions Q 2  minutes. IUPC  50- 70 mmHg baseline RT is in the negatives so zero'ed and will see if adjusts it does to 10-15 contractions are 50-70 seconds    FHRM: Baseline 125 , mod variability, Accels +, no decels  Pitocin: Pitocin was at 16 due to no break in U/C's decreased to 12 milliunit,for a while but increased to13 jut now   Pain control: Epidural     Assessment: 21 y.o. female    at 40w6d.  Complete  Pushing started at  and was pushing fair to well with u/cs for about  30 minutes before she started to get totally exhausted and hyperventilating.  Pushing stopped at  for a rest period turned her way over on her left side     Plan:   1. Labor: 2nd stage will take a rest for 30 minutes or so  2. IUP:  EFW 3500 gms, Category 1 tracing, vtx presentation.  GBS neg   3. Anticipate

## 2017-10-05 NOTE — PROGRESS NOTES
[facility] LABOR AND DELIVERY PROGRESS NOTE    PATIENT ID:  NAME:  Kadi Avendano  MRN:               [id number]  YOB: 1995     21 y.o. female  at 40w6d.    Subjective: pt was sleeping well with epidural but we needed to start pushing again  Objective:    Vitals:    10/04/17 1900 10/04/17 1930 10/04/17 2000 10/04/17 2045   BP: 150/97 143/95 130/92 146/95   Pulse: 98 (!) 105 (!) 141 (!) 116   Temp:       SpO2: 95% 98% 96% 96%   Weight:       Height:           Cervix:  complete +1   [place]: Uterine Contractions Q2-3 minutes. 50-70 seconds   FHRM: [id number], avg variability, Accels +,  varibales with pushingdecels  Pitocin: at 13 milliunits  Pain control: epidural    Assessment: 21 y.o. female    at 40w6d.    Plan:   1. Labor:  2nd stage  2. IUP:  Category 1-2 tracing, vtx presentation.  GBSneg  3. Anticipate   4. Pt is pushing well again

## 2017-10-05 NOTE — PROGRESS NOTES
I was called back into room for evaluation of blood pressure, 84/51, pt had lost another large set of large clots that RN measured and was around 550cc about 1 hour ago, this makes total loss since delivery approx 1050 cc. No active bleeding or clots noted right now, IV LR 20 units Pitocin W/O for bolus, IV of LR W/O, Fundus firm, Dr Horn is notified, orders to turn MGSO4 off.  MGSO4 level at 6.0.  Within 2 minutes of IV boluses b/p starts to increase 90/62 and 100/56, at 10 minutes 110/74.  Pt is alert and oriented x 3 she is tired but conversing with us.  Stat CBC was ordered

## 2017-10-05 NOTE — PROGRESS NOTES
Report received. Assessment done - VSS. Pt oriented to rm and skylight. Plan of care discussed. Call light placed within reach.  Pt educated about safety of infant. Pt instructed to call before ambulation. All questions answered. Bleeding light, fundus firm. Pt instructed not to ambulate without calling for assistance.

## 2017-10-06 VITALS
HEART RATE: 98 BPM | TEMPERATURE: 98.4 F | DIASTOLIC BLOOD PRESSURE: 93 MMHG | RESPIRATION RATE: 18 BRPM | WEIGHT: 184 LBS | BODY MASS INDEX: 30.66 KG/M2 | SYSTOLIC BLOOD PRESSURE: 131 MMHG | OXYGEN SATURATION: 96 % | HEIGHT: 65 IN

## 2017-10-06 LAB
ABO GROUP BLD: NORMAL
BARCODED ABORH UBTYP: 5100
BARCODED PRD CODE UBPRD: NORMAL
BARCODED UNIT NUM UBUNT: NORMAL
BLD GP AB SCN SERPL QL: NORMAL
COMPONENT R 8504R: NORMAL
ERYTHROCYTE [DISTWIDTH] IN BLOOD BY AUTOMATED COUNT: 45.9 FL (ref 35.9–50)
ERYTHROCYTE [DISTWIDTH] IN BLOOD BY AUTOMATED COUNT: 46.5 FL (ref 35.9–50)
HCT VFR BLD AUTO: 18.4 % (ref 37–47)
HCT VFR BLD AUTO: 21.2 % (ref 37–47)
HGB BLD-MCNC: 6 G/DL (ref 12–16)
HGB BLD-MCNC: 7.2 G/DL (ref 12–16)
MCH RBC QN AUTO: 30 PG (ref 27–33)
MCH RBC QN AUTO: 30.4 PG (ref 27–33)
MCHC RBC AUTO-ENTMCNC: 33 G/DL (ref 33.6–35)
MCHC RBC AUTO-ENTMCNC: 34 G/DL (ref 33.6–35)
MCV RBC AUTO: 89.5 FL (ref 81.4–97.8)
MCV RBC AUTO: 91 FL (ref 81.4–97.8)
PLATELET # BLD AUTO: 129 K/UL (ref 164–446)
PLATELET # BLD AUTO: 142 K/UL (ref 164–446)
PMV BLD AUTO: 11.7 FL (ref 9–12.9)
PMV BLD AUTO: 12.4 FL (ref 9–12.9)
PRODUCT TYPE UPROD: NORMAL
RBC # BLD AUTO: 2 M/UL (ref 4.2–5.4)
RBC # BLD AUTO: 2.37 M/UL (ref 4.2–5.4)
RH BLD: NORMAL
UNIT STATUS USTAT: NORMAL
WBC # BLD AUTO: 14.2 K/UL (ref 4.8–10.8)
WBC # BLD AUTO: 14.7 K/UL (ref 4.8–10.8)

## 2017-10-06 PROCEDURE — 36430 TRANSFUSION BLD/BLD COMPNT: CPT

## 2017-10-06 PROCEDURE — 700112 HCHG RX REV CODE 229: Performed by: NURSE PRACTITIONER

## 2017-10-06 PROCEDURE — 86901 BLOOD TYPING SEROLOGIC RH(D): CPT

## 2017-10-06 PROCEDURE — A9270 NON-COVERED ITEM OR SERVICE: HCPCS | Performed by: NURSE PRACTITIONER

## 2017-10-06 PROCEDURE — 88720 BILIRUBIN TOTAL TRANSCUT: CPT

## 2017-10-06 PROCEDURE — 700102 HCHG RX REV CODE 250 W/ 637 OVERRIDE(OP): Performed by: NURSE PRACTITIONER

## 2017-10-06 PROCEDURE — 85027 COMPLETE CBC AUTOMATED: CPT

## 2017-10-06 PROCEDURE — 700105 HCHG RX REV CODE 258: Performed by: NURSE PRACTITIONER

## 2017-10-06 PROCEDURE — P9016 RBC LEUKOCYTES REDUCED: HCPCS

## 2017-10-06 PROCEDURE — 86900 BLOOD TYPING SEROLOGIC ABO: CPT

## 2017-10-06 PROCEDURE — 36415 COLL VENOUS BLD VENIPUNCTURE: CPT

## 2017-10-06 PROCEDURE — 86923 COMPATIBILITY TEST ELECTRIC: CPT

## 2017-10-06 PROCEDURE — 86850 RBC ANTIBODY SCREEN: CPT

## 2017-10-06 RX ORDER — IBUPROFEN 600 MG/1
600 TABLET ORAL EVERY 6 HOURS PRN
Qty: 30 TAB | Refills: 1 | Status: SHIPPED | OUTPATIENT
Start: 2017-10-06 | End: 2017-10-06

## 2017-10-06 RX ORDER — FERROUS SULFATE 325(65) MG
325 TABLET ORAL DAILY
Qty: 30 TAB | Refills: 3 | Status: SHIPPED | OUTPATIENT
Start: 2017-10-06 | End: 2017-10-06

## 2017-10-06 RX ORDER — IBUPROFEN 600 MG/1
600 TABLET ORAL EVERY 6 HOURS PRN
Qty: 30 TAB | Refills: 1 | Status: SHIPPED | OUTPATIENT
Start: 2017-10-06 | End: 2021-03-26

## 2017-10-06 RX ORDER — FERROUS SULFATE 325(65) MG
325 TABLET ORAL DAILY
Qty: 30 TAB | Refills: 3 | Status: SHIPPED | OUTPATIENT
Start: 2017-10-06 | End: 2021-03-26

## 2017-10-06 RX ORDER — SODIUM CHLORIDE 9 MG/ML
INJECTION, SOLUTION INTRAVENOUS CONTINUOUS
Status: DISCONTINUED | OUTPATIENT
Start: 2017-10-06 | End: 2017-10-06 | Stop reason: HOSPADM

## 2017-10-06 RX ADMIN — DOCUSATE SODIUM 100 MG: 100 CAPSULE ORAL at 14:29

## 2017-10-06 RX ADMIN — OXYCODONE HYDROCHLORIDE AND ACETAMINOPHEN 1 TABLET: 5; 325 TABLET ORAL at 17:51

## 2017-10-06 RX ADMIN — IBUPROFEN 600 MG: 600 TABLET, FILM COATED ORAL at 09:57

## 2017-10-06 RX ADMIN — OXYCODONE HYDROCHLORIDE AND ACETAMINOPHEN 1 TABLET: 5; 325 TABLET ORAL at 09:57

## 2017-10-06 RX ADMIN — IBUPROFEN 600 MG: 600 TABLET, FILM COATED ORAL at 17:51

## 2017-10-06 RX ADMIN — SODIUM CHLORIDE 1000 ML: 9 INJECTION, SOLUTION INTRAVENOUS at 05:09

## 2017-10-06 ASSESSMENT — PAIN SCALES - GENERAL
PAINLEVEL_OUTOF10: 6
PAINLEVEL_OUTOF10: 0
PAINLEVEL_OUTOF10: 2
PAINLEVEL_OUTOF10: 0
PAINLEVEL_OUTOF10: 1
PAINLEVEL_OUTOF10: 6
PAINLEVEL_OUTOF10: 2
PAINLEVEL_OUTOF10: 0
PAINLEVEL_OUTOF10: 1

## 2017-10-06 NOTE — PROGRESS NOTES
0445- LAB. RESULTS RELAYED TO JACKIE SHARMA CNM WITH ORDERS MADE AND CARRIED OUT, TO TRANSFUSE 1 UNIT OF PACK RBC AND REPEAT CBC @ 1200. POSSIBLE DISCHARGE @ 1800.

## 2017-10-06 NOTE — PROGRESS NOTES
0619- V/S taken and recorded. Blood transfusion started.  0635- Re-checked V/S 15 min.after starting blood transfusion. No reaction noted @ this time. Will continue to monitor.

## 2017-10-06 NOTE — CARE PLAN
Problem: Alteration in comfort related to episiotomy, vaginal repair and/or after birth pains  Goal: Patient is able to ambulate, care for self and infant  Outcome: PROGRESSING AS EXPECTED

## 2017-10-06 NOTE — PROGRESS NOTES
Resident Luis Antonio sotomayor MD  here and informed that pt is  up in room and feeling much better after unit of blood.  Infant discharged and will see how pt is at 1800 for possible discharge.

## 2017-10-06 NOTE — PROGRESS NOTES
Kavya from Lab called with critical result of H/H 6.0/18.4 at 0440 Critical lab result read back to Kavya.  Elisa Mckeon CNM notified of critical lab result at 0445  Critical lab result read back by Elisa Mckeon CNM.

## 2017-10-06 NOTE — PROGRESS NOTES
Assessment done,  Lactation in to assess infants lack of latching.  Blood infusing, up to bathroom without problems

## 2017-10-06 NOTE — PROGRESS NOTES
1900- Bedside report done, patient breast feeding @ this time. C/O abdominal cramping with score of 5/10, PRN medication given. Will continue to monitor.  2100- Up to BR with assistance, ambulating well with steady gait no dizziness noted. Have  showered with the help of FOB.

## 2017-10-06 NOTE — CONSULTS
After evaluating a breastfeeding session, (see infant flow sheet), I discussed supplementation and pumping with mom who is currently receiving a blood transfusion.  Both parents are comfortable with this plan and request donor milk supplementation.

## 2017-10-07 NOTE — DISCHARGE INSTRUCTIONS
POSTPARTUM DISCHARGE INSTRUCTIONS FOR MOM    YOB: 1995   Age: 21 y.o.               Admit Date: 10/4/2017     Discharge Date: 10/6/2017  Attending Doctor:  Jorge Castano M.D.                  Allergies:  Review of patient's allergies indicates no known allergies.    Discharged to home by car. Discharged via wheelchair, hospital escort: Yes.  Special equipment needed: Not Applicable  Belongings with: Personal  Be sure to schedule a follow-up appointment with your primary care doctor or any specialists as instructed.     Discharge Plan:   Diet Plan: Discussed  Activity Level: Discussed  Confirmed Follow up Appointment: Patient to Call and Schedule Appointment  Confirmed Symptoms Management: Discussed  Medication Reconciliation Updated: Yes  Influenza Vaccine Indication: Not indicated: Previously immunized this influenza season and > 8 years of age    REASONS TO CALL YOUR OBSTETRICIAN:  1.   Persistent fever or shaking chills (Temperature higher than 100.4)  2.   Heavy bleeding (soaking more than 1 pad per hour); Passing clots  3.   Foul odor from vagina  4.   Mastitis (Breast infection; breast pain, chills, fever, redness)  5.   Urinary pain, burning or frequency  6.   Episiotomy infection  7.   Abdominal incision infection  8.   Severe depression longer than 24 hours    HAND WASHING  · Prior to handling the baby.  · Before breastfeeding or bottle feeding baby.  · After using the bathroom or changing the baby's diaper.    WOUND CARE  Ask your physician for additional care instructions.  In general:    ·  Incision:      · Keep clean and dry.    · Do NOT lift anything heavier than your baby for up to 6 weeks.    · There should not be any opening or pus.      VAGINAL CARE  · Nothing inside vagina for 6 weeks: no sexual intercourse, tampons or douching.  · Bleeding may continue for 2-4 weeks.  Amount may vary.    · Call your physician for heavy bleeding which means soaking more than 1 pad per  "hour    BIRTH CONTROL  · It is possible to become pregnant at any time after delivery and while breastfeeding.  · Plan to discuss a method of birth control with your physician at your follow up visit. visit.    DIET AND ELIMINATION  · Eating more fiber (bran cereal, fruits, and vegetables) and drinking plenty of fluids will help to avoid constipation.  · Urinary frequency after childbirth is normal.    POSTPARTUM BLUES  During the first few days after birth, you may experience a sense of the \"blues\" which may include impatience, irritability or even crying.  These feeling come and go quickly.  However, as many as 1 in 10 women experience emotional symptoms known as postpartum depression.    Postpartum depression:  May start as early as the second or third day after delivery or take several weeks or months to develop.  Symptoms of \"blues\" are present, but are more intense:  Crying spells; loss of appetite; feelings of hopelessness or loss of control; fear of touching the baby; over concern or no concern at all about the baby; little or no concern about your own appearance/caring for yourself; and/or inability to sleep or excessive sleeping.  Contact your physician if you are experiencing any of these symptoms.    Crisis Hotline:  · [place] Crisis Hotline:  1-372-SYICDWP  Or 1-147.310.4774  · [place] Crisis Hotline:  1-666.383.7531  Or 863-639-0118    PREVENTING SHAKEN BABY:  If you are angry or stressed, PUT THE BABY IN THE CRIB, step away, take some deep breaths, and wait until you are calm to care for the baby.  DO NOT SHAKE THE BABY.  You are not alone, call a supporter for help.    · Crisis [facility] Center 24/7 crisis line 550-776-6548 or 1-188.207.3746  · You can also text them, text \"ANSWER\" to 750478    QUIT SMOKING/TOBACCO USE:  I understand the use of any tobacco products increases my chance of suffering from future heart disease and could cause other illnesses which may shorten my life. Quitting the use of " tobacco products is the single most important thing I can do to improve my health. For further information on smoking / tobacco cessation call a Toll Free Quit Line at 1-400.262.6194 (*[employer] [place]) or 1-795.712.7863 (American Lung Association) or you can access the web based program at www.lungusa.org.    · [place] Tobacco Users Help Line:  (639) 620-2587       Toll Free: 1-827.558.7063  · Quit Tobacco Program [facility] (969)266-4369    DEPRESSION / SUICIDE RISK:  As you are discharged from this [facility], it is important to learn how to keep safe from harming yourself.    Recognize the warning signs:  · Abrupt changes in personality, positive or negative- including increase in energy   · Giving away possessions  · Change in eating patterns- significant weight changes-  positive or negative  · Change in sleeping patterns- unable to sleep or sleeping all the time   · Unwillingness or inability to communicate  · Depression  · Unusual sadness, discouragement and loneliness  · Talk of wanting to die  · Neglect of personal appearance   · Rebelliousness- reckless behavior  · Withdrawal from people/activities they love  · Confusion- inability to concentrate     If you or a loved one observes any of these behaviors or has concerns about self-harm, here's what you can do:  · Talk about it- your feelings and reasons for harming yourself  · Remove any means that you might use to hurt yourself (examples: pills, rope, extension cords, firearm)  · Get professional help from the community (Mental Health, Substance Abuse, psychological counseling)  · Do not be alone:Call your Safe Contact- someone whom you trust who will be there for you.  · Call your local CRISIS HOTLINE 412-4092 or 807-887-0137  · Call your local Children's Mobile Crisis Response Team [place] (114) 783-2829 or www.[employer]  · Call the toll free National Suicide Prevention Hotlines   · National  Suicide Prevention Lifeline 974-279-ELPA (1660)  · [facility] 800-SUICIDE (486-6180)    DISCHARGE SURVEY:  Thank you for choosing [facility].  We hope we provided you with very good care.  You may be receiving a survey in the mail.  Please fill it out.  Your opinion is valuable to us.    ADDITIONAL EDUCATIONAL MATERIALS GIVEN TO PATIENT:  Pelvic rest for 6 weeks   Ambulate   Encourage breastfeeding    Sitz bath PRN   If diabetic, continue to check blood sugars as previously directed      My signature on this form indicates that:  1.  I have reviewed and understand the above information  2.  My questions regarding this information have been answered to my satisfaction.  3.  I have formulated a plan with my discharge nurse to obtain my prescribed medication for home.

## 2017-10-07 NOTE — PROGRESS NOTES
Assessment completed. Patient progressing according to plan of care. Patient up and ambulating. Pt states she is voiding without difficulty. Patient claims to have good pain relief with prn pain medications. Pt denies any other issues at this time.

## 2017-10-07 NOTE — PROGRESS NOTES
Patient discharged home via wheelchair. Instructions for infant care and patient care given to patient. Follow up instructions and appoinments given to patient. Educated on infant's bili zap, educated to feed infant every 2-3 hours and to supplement. Bands identifications checked with infant.

## 2017-10-07 NOTE — PROGRESS NOTES
Discharge teaching reviewed with patient, all questions answered. Pt given all written information on self and infant care, including prescriptions and follow-up instructions. Pt doing well with infant, and feels comfortable with her feeding plan.  Report given to Adrianna BOSTON.

## 2017-10-12 NOTE — DISCHARGE SUMMARY
Discharge Summary:      Kadi Avendano      Admit Date:   10/4/2017  Discharge Date:  10/6/2017     Admitting diagnosis:  Pregnancy  Labor and delivery indication for care or intervention  Discharge Diagnosis: Status post vaginal, spontaneous.  Pregnancy Complications: none  Tubal Ligation:  no        History:  Past Medical History:   Diagnosis Date   • Anemia      OB History    Para Term  AB Living   1             SAB TAB Ectopic Molar Multiple Live Births                    # Outcome Date GA Lbr Silvino/2nd Weight Sex Delivery Anes PTL Lv   1 Current                    Review of patient's allergies indicates no known allergies.  Patient Active Problem List    Diagnosis Date Noted   • Supervision of normal first pregnancy 2017        Hospital Course:   21 y.o. , now para 1, was admitted with the above mentioned diagnosis, underwent Active Labor, vaginal, spontaneous. Patient postpartum course was unremarkable, with progressive advancement in diet , ambulation and toleration of oral analgesia. Patient without complaints today and desires discharge.      Vitals:    10/06/17 1200 10/06/17 1650 10/06/17 1745 10/06/17 2000   BP: 123/84   131/93   Pulse: 100   98   Resp: 18   18   Temp: 37.3 °C (99.1 °F) 37.7 °C (99.9 °F) 36.5 °C (97.7 °F) 36.9 °C (98.4 °F)   TempSrc:       SpO2: 98%   96%   Weight:       Height:           No current facility-administered medications for this encounter.      Current Outpatient Prescriptions   Medication   • ibuprofen (MOTRIN) 600 MG Tab   • ferrous sulfate 325 (65 Fe) MG tablet   • PRENATAL VIT W/ FE BISG-FA PO       Exam:  Breast Exam: negative  Abdomen: Abdomen soft, non-tender. BS normal. No masses,  No organomegaly  Fundus Non Tender: yes  Incision: none  Perineum: 2nd degree tear  Extremity: extremities, peripheral pulses and reflexes normal     Labs:         Activity:   Discharge to home  Pelvic Rest x 6 weeks    Assessment:  normal postpartum  course  Discharge Assessment: No areas of skin breakdown/redness; surgical incision intact/healing     Follow up: .[facility]  in 5 weeks for vaginal      Discharge Meds:   Current Outpatient Prescriptions   Medication Sig Dispense Refill   • ibuprofen (MOTRIN) 600 MG Tab Take 1 Tab by mouth every 6 hours as needed (For cramping after delivery; do not give if patient is receiving ketorolac (Toradol)). 30 Tab 1   • ferrous sulfate 325 (65 Fe) MG tablet Take 1 Tab by mouth every day. 30 Tab 3   • PRENATAL VIT W/ FE BISG-FA PO Take  by mouth.         BRITTNEE Cook.

## 2017-11-06 ENCOUNTER — POST PARTUM (OUTPATIENT)
Dept: OBGYN | Facility: CLINIC | Age: 22
End: 2017-11-06
Payer: MEDICAID

## 2017-11-06 VITALS
DIASTOLIC BLOOD PRESSURE: 72 MMHG | HEIGHT: 65 IN | WEIGHT: 151 LBS | SYSTOLIC BLOOD PRESSURE: 122 MMHG | BODY MASS INDEX: 25.16 KG/M2

## 2017-11-06 PROBLEM — Z34.00 SUPERVISION OF NORMAL FIRST PREGNANCY: Status: RESOLVED | Noted: 2017-04-12 | Resolved: 2017-11-06

## 2017-11-06 PROCEDURE — 90050 PR POSTPARTUM VISIT: CPT | Performed by: PHYSICIAN ASSISTANT

## 2017-11-06 ASSESSMENT — PATIENT HEALTH QUESTIONNAIRE - PHQ9
SUM OF ALL RESPONSES TO PHQ QUESTIONS 1-9: 2
CLINICAL INTERPRETATION OF PHQ2 SCORE: 1
5. POOR APPETITE OR OVEREATING: 0 - NOT AT ALL

## 2017-11-06 NOTE — PROGRESS NOTES
"Subjective:      Kadi Avendano is a 21 y.o. female who presents with postpartum visit today. 5wk s/p  at term with mild preeclampsia, augmented labor at 41wk. Pt has no complaints- denies heavy vaginal bleeding, depression, intercourse, pain or problems with BF. PAP wnl  - repeat . BCM desired is Nexplanon, though all methods d/w pt, and pt does not think she will be able to remember to take BCP.            HPI    ROS       Objective:     /72   Ht 1.651 m (5' 5\")   Wt 68.5 kg (151 lb)   LMP  (LMP Unknown)   Breastfeeding? Unknown   BMI 25.13 kg/m²      Physical Exam   Constitutional: She appears well-developed and well-nourished.   HENT:   Head: Normocephalic and atraumatic.   Eyes: Pupils are equal, round, and reactive to light.   Neck: Normal range of motion. Neck supple. No thyromegaly present.   Cardiovascular: Normal rate, regular rhythm and normal heart sounds.    Pulmonary/Chest: Effort normal and breath sounds normal. No respiratory distress.   Abdominal: Soft. Bowel sounds are normal. She exhibits no distension. There is no tenderness.   Genitourinary: Vagina normal and uterus normal. Pelvic exam was performed with patient supine. There is no rash or tenderness on the right labia. There is no rash or tenderness on the left labia. Uterus is not deviated, not enlarged and not tender. Cervix exhibits no motion tenderness. Right adnexum displays no mass and no tenderness. Left adnexum displays no mass and no tenderness. No erythema in the vagina. No foreign body in the vagina. No signs of injury around the vagina. No vaginal discharge found.   Neurological: She is alert. She has normal reflexes.   Skin: Skin is warm and dry. No erythema.   Psychiatric: She has a normal mood and affect. Her behavior is normal. Thought content normal.   Vitals reviewed.              Assessment/Plan:     1. Postpartum care following vaginal delivery  - Repeat PAP   - Make Gyn appt for Nexplanon, " use condoms in meantime - given today

## 2017-11-06 NOTE — NON-PROVIDER
Pt here today for postpartum exam.  Delivery type & date: Vaginal Delivery 10/04/2017  Birth control method desired:not sure yet   Currently :breast feeding and  bottle feeding   LMP: not yet, still bleeding   Last pap: 4/14/2017  normal  Phone # [phone]  Sad, or crying : No

## 2017-11-06 NOTE — LETTER
November 6, 2017       Patient: Kadi Avendano   YOB: 1995   Date of Visit: 11/6/2017         To Whom It May Concern:    It is my medical opinion that Kadi Avendano needs whole milk while breastfeeding. Thank you.     If you have any questions or concerns, please don't hesitate to call [phone]          Sincerely,          AMA Loredo.

## 2017-12-01 ENCOUNTER — GYNECOLOGY VISIT (OUTPATIENT)
Dept: OBGYN | Facility: CLINIC | Age: 22
End: 2017-12-01
Payer: MEDICAID

## 2017-12-01 VITALS — SYSTOLIC BLOOD PRESSURE: 112 MMHG | DIASTOLIC BLOOD PRESSURE: 70 MMHG | WEIGHT: 148 LBS | BODY MASS INDEX: 24.63 KG/M2

## 2017-12-01 DIAGNOSIS — Z30.09 FAMILY PLANNING: ICD-10-CM

## 2017-12-01 PROCEDURE — 99213 OFFICE O/P EST LOW 20 MIN: CPT | Performed by: OBSTETRICS & GYNECOLOGY

## 2017-12-01 RX ORDER — ACETAMINOPHEN AND CODEINE PHOSPHATE 120; 12 MG/5ML; MG/5ML
1 SOLUTION ORAL DAILY
Qty: 28 TAB | Refills: 11 | Status: SHIPPED | OUTPATIENT
Start: 2017-12-01 | End: 2021-03-26

## 2017-12-01 NOTE — PROGRESS NOTES
Chief complaint;    Kadi Avendano is a 21 y.o.  who presents for family planning-recently delivered 2 months ago currently nursing    Review of systems; denies fever chills abdominal pain, denies chest pain shortness of breath or urinary symptoms  Past medical history-  Past Medical History:   Diagnosis Date   • Anemia    • Hyperlipidemia     Preeclapsia      Past surgical history-History reviewed. No pertinent surgical history.  Allergies-Patient has no known allergies.  Medications-  Current Outpatient Prescriptions on File Prior to Visit   Medication Sig Dispense Refill   • ibuprofen (MOTRIN) 600 MG Tab Take 1 Tab by mouth every 6 hours as needed (For cramping after delivery; do not give if patient is receiving ketorolac (Toradol)). 30 Tab 1   • ferrous sulfate 325 (65 Fe) MG tablet Take 1 Tab by mouth every day. 30 Tab 3   • PRENATAL VIT W/ FE BISG-FA PO Take  by mouth.       No current facility-administered medications on file prior to visit.      Social history-  Social History     Social History   • Marital status: Single     Spouse name: N/A   • Number of children: N/A   • Years of education: N/A     Occupational History   • Not on file.     Social History Main Topics   • Smoking status: Never Smoker   • Smokeless tobacco: Never Used   • Alcohol use No   • Drug use: No   • Sexual activity: Yes     Partners: Male      Comment: none. unplanned pregnancy     Other Topics Concern   • Not on file     Social History Narrative   • No narrative on file     Past Family History-no history of breast or ovarian cancer    Physical examination;  Alert and oriented x3  General a thin well-developed well-nourished female in no apparent distress  Vitals:    17 1011   BP: 112/70   Weight: 67.1 kg (148 lb)         Impression;  Family planning  Plan;  Micronor for birth control-prescription sent to pharmacy  Patient to change over combination OCP after she stops nursing

## 2017-12-01 NOTE — NON-PROVIDER
GYN visit to discuss birth control   10/04/2017  WT:148 lb  BP: 112/70  LMP: 2017  Pt states she is having constipation and hemorrhoid. States no other complaints  Breast and formula feeding  Good # [phone]

## 2017-12-18 ENCOUNTER — HOSPITAL ENCOUNTER (OUTPATIENT)
Facility: MEDICAL CENTER | Age: 22
End: 2017-12-18
Attending: FAMILY MEDICINE
Payer: MEDICAID

## 2017-12-18 ENCOUNTER — OFFICE VISIT (OUTPATIENT)
Dept: URGENT CARE | Facility: PHYSICIAN GROUP | Age: 22
End: 2017-12-18
Payer: MEDICAID

## 2017-12-18 VITALS
DIASTOLIC BLOOD PRESSURE: 72 MMHG | BODY MASS INDEX: 24.66 KG/M2 | SYSTOLIC BLOOD PRESSURE: 110 MMHG | HEIGHT: 65 IN | TEMPERATURE: 98.3 F | WEIGHT: 148 LBS | HEART RATE: 103 BPM | RESPIRATION RATE: 16 BRPM | OXYGEN SATURATION: 98 %

## 2017-12-18 DIAGNOSIS — N39.0 ACUTE URINARY TRACT INFECTION: ICD-10-CM

## 2017-12-18 LAB
APPEARANCE UR: ABNORMAL
BILIRUB UR STRIP-MCNC: NEGATIVE MG/DL
COLOR UR AUTO: YELLOW
GLUCOSE UR STRIP.AUTO-MCNC: NEGATIVE MG/DL
KETONES UR STRIP.AUTO-MCNC: NEGATIVE MG/DL
LEUKOCYTE ESTERASE UR QL STRIP.AUTO: ABNORMAL
NITRITE UR QL STRIP.AUTO: NEGATIVE
PH UR STRIP.AUTO: 5 [PH] (ref 5–8)
PROT UR QL STRIP: ABNORMAL MG/DL
RBC UR QL AUTO: ABNORMAL
SP GR UR STRIP.AUTO: 1.01
UROBILINOGEN UR STRIP-MCNC: NEGATIVE MG/DL

## 2017-12-18 PROCEDURE — 99204 OFFICE O/P NEW MOD 45 MIN: CPT | Performed by: FAMILY MEDICINE

## 2017-12-18 PROCEDURE — 87186 SC STD MICRODIL/AGAR DIL: CPT

## 2017-12-18 PROCEDURE — 81002 URINALYSIS NONAUTO W/O SCOPE: CPT | Performed by: FAMILY MEDICINE

## 2017-12-18 PROCEDURE — 87086 URINE CULTURE/COLONY COUNT: CPT

## 2017-12-18 PROCEDURE — 87077 CULTURE AEROBIC IDENTIFY: CPT

## 2017-12-18 RX ORDER — NITROFURANTOIN 25; 75 MG/1; MG/1
100 CAPSULE ORAL EVERY 12 HOURS
Qty: 10 CAP | Refills: 0 | Status: SHIPPED | OUTPATIENT
Start: 2017-12-18 | End: 2017-12-23

## 2017-12-18 NOTE — PROGRESS NOTES
Chief Complaint:    Chief Complaint   Patient presents with   • Urinary Frequency       History of Present Illness:    This is a new problem. Symptoms x 6 days. Has dysuria and urinary urgency. No hematuria. Last UTI was many years ago. Breastfeeding      Review of Systems:    Constitutional: Negative for fever, chills, and diaphoresis.   Eyes: Negative for change in vision, photophobia, pain, redness, and discharge.  ENT: Negative for ear pain, ear discharge, hearing loss, tinnitus, nasal congestion, nosebleeds, and sore throat.    Respiratory: Negative for cough, hemoptysis, sputum production, shortness of breath, wheezing, and stridor.    Cardiovascular: Negative for chest pain, palpitations, orthopnea, claudication, leg swelling, and PND.   Gastrointestinal: Negative for abdominal pain, nausea, vomiting, diarrhea, constipation, blood in stool, and melena.   Genitourinary: See HPI.  Musculoskeletal: Negative for myalgias, joint pain, neck pain, and back pain.   Skin: Negative for rash and itching.   Neurological: Negative for dizziness, tingling, tremors, sensory change, speech change, focal weakness, seizures, loss of consciousness, and headaches.   Endo: Negative for polydipsia.   Heme: Does not bruise/bleed easily.   Psychiatric/Behavioral: Negative for depression, suicidal ideas, hallucinations, memory loss and substance abuse. The patient is not nervous/anxious and does not have insomnia.      Past Medical History:    Past Medical History:   Diagnosis Date   • Anemia 2016   • Hyperlipidemia     Preeclapsia        Past Surgical History:    History reviewed. No pertinent surgical history.    Social History:    Social History     Social History   • Marital status: Single     Spouse name: N/A   • Number of children: N/A   • Years of education: N/A     Occupational History   • Not on file.     Social History Main Topics   • Smoking status: Never Smoker   • Smokeless tobacco: Never Used   • Alcohol use No   • Drug  "use: No   • Sexual activity: Yes     Partners: Male      Comment: none. unplanned pregnancy     Other Topics Concern   • Not on file     Social History Narrative   • No narrative on file       Family History:    Family History   Problem Relation Age of Onset   • No Known Problems Mother    • No Known Problems Father    • No Known Problems Brother        Medications:    Current Outpatient Prescriptions on File Prior to Visit   Medication Sig Dispense Refill   • norethindrone (MICRONOR) 0.35 MG tablet Take 1 Tab by mouth every day. 28 Tab 11   • ibuprofen (MOTRIN) 600 MG Tab Take 1 Tab by mouth every 6 hours as needed (For cramping after delivery; do not give if patient is receiving ketorolac (Toradol)). 30 Tab 1   • ferrous sulfate 325 (65 Fe) MG tablet Take 1 Tab by mouth every day. 30 Tab 3   • PRENATAL VIT W/ FE BISG-FA PO Take  by mouth.       No current facility-administered medications on file prior to visit.        Allergies:    No Known Allergies      Vitals:    Vitals:    12/18/17 1103   BP: 110/72   Pulse: (!) 103   Resp: 16   Temp: 36.8 °C (98.3 °F)   SpO2: 98%   Weight: 67.1 kg (148 lb)   Height: 1.651 m (5' 5\")       Physical Exam:    Constitutional: Vital signs reviewed. Appears well-developed and well-nourished. No acute distress.   Eyes: Sclera white, conjunctivae clear.   ENT: External ears normal. Hearing normal.  Neck: Neck supple.   Pulmonary/Chest: Respirations non-labored.   Abdomen: Bowel sounds are normal active. Soft, non-distended, and non-tender to palpation.    Musculoskeletal: No CVA TTP bilaterally. Normal gait. Normal range of motion. No muscular atrophy or weakness.  Neurological: Alert and oriented to person, place, and time. Muscle tone normal. Coordination normal. Light touch and sensation normal.   Skin: No rashes or lesions. Warm, dry, normal turgor.  Psychiatric: Normal mood and affect. Behavior is normal. Judgment and thought content normal.     Diagnostics:    POCT URINALYSIS " (Order #[id number]) on 12/18/17   Component Results     Component Value Ref Range & Units Status   POC Color yellow Negative Final   POC Appearance cloudy Negative Final   POC Leukocyte Esterase large Negative Final   POC Nitrites negative Negative Final   POC Urobiligen negative Negative (0.2) mg/dL Final   POC Protein trace Negative mg/dL Final   POC Urine PH 5.0 5.0 - 8.0 Final   POC Blood large Negative Final   POC Specific Gravity 1.015 <1.005 - >1.030 Final   POC Ketones negative Negative mg/dL Final   POC Biliruben negative Negative mg/dL Final   POC Glucose negative Negative mg/dL Final   Last Resulted Time   Mon Dec 18, 2017 11:09 AM       Assessment / Plan:    1. Acute urinary tract infection  - POCT Urinalysis  - nitrofurantoin monohydr macro (MACROBID) 100 MG Cap; Take 1 Cap by mouth every 12 hours for 5 days.  Dispense: 10 Cap; Refill: 0  - URINE CULTURE(NEW); Future      Discussed with her DDX and management options.    Agreeable to medication prescribed and send urine for culture.    Says may call [phone] with Urine Culture result and OK to leave message with result.    Follow-up with PCP or urgent care if getting worse while waiting for Urine Culture result.

## 2017-12-20 LAB
BACTERIA UR CULT: ABNORMAL
BACTERIA UR CULT: ABNORMAL
SIGNIFICANT IND 70042: ABNORMAL
SITE SITE: ABNORMAL
SOURCE SOURCE: ABNORMAL

## 2020-12-14 ENCOUNTER — INITIAL PRENATAL (OUTPATIENT)
Dept: OBGYN | Facility: CLINIC | Age: 25
End: 2020-12-14
Payer: OTHER GOVERNMENT

## 2020-12-14 VITALS — WEIGHT: 155 LBS | SYSTOLIC BLOOD PRESSURE: 122 MMHG | BODY MASS INDEX: 25.79 KG/M2 | DIASTOLIC BLOOD PRESSURE: 76 MMHG

## 2020-12-14 DIAGNOSIS — N93.8 DUB (DYSFUNCTIONAL UTERINE BLEEDING): ICD-10-CM

## 2020-12-14 LAB
INT CON NEG: NEGATIVE
INT CON POS: POSITIVE
POC URINE PREGNANCY TEST: POSITIVE

## 2020-12-14 PROCEDURE — 99203 OFFICE O/P NEW LOW 30 MIN: CPT | Performed by: OBSTETRICS & GYNECOLOGY

## 2020-12-14 PROCEDURE — 81025 URINE PREGNANCY TEST: CPT | Performed by: OBSTETRICS & GYNECOLOGY

## 2020-12-14 PROCEDURE — 76705 ECHO EXAM OF ABDOMEN: CPT | Performed by: OBSTETRICS & GYNECOLOGY

## 2020-12-14 NOTE — PROGRESS NOTES
CC: Missed menses    Kadi Soliman is a 24 y.o.  who presents presents due to missed menses. Patient's last menstrual period was 2020 (exact date). LMP NOT exact. Either  or .  She was not using anything for birthcontrol.  This is not a planned but is a desired pregnancy.   Reports she has been feeling nauseous thus far in pregnancy. She reports nausea/vomiting, denies headache, denies dysuria, denies  vaginal bleeding/spotting, and denies contractions/cramping.    Partner: Mark    Had pre-eclampsia with previous pregnancy but delivered full term.     Review of systems:  Pertinent positives documented in HPI and all other systems reviewed & are negative    GYN History:  Patient's last menstrual period was 2020 (exact date). Menses regular, lasting 4-5 days, not particularly heavy.  Last pap- doesn't know if she has had one,  no h/o abnormal pap.  No history of cone biopsy, LEEP or any other cervical, uterine or gynecologic surgery. No history of sexually transmitted diseases.  Currently sexually active with her partner.      OB History:      All PMH, PSH, allergies, social history and FH reviewed and updated today:  Past Medical History:  Past Medical History:   Diagnosis Date   • Anemia    • Blood transfusion without reported diagnosis    • Hyperlipidemia     Preeclapsia        Past Surgical History:  History reviewed. No pertinent surgical history.    Medications:   Current Outpatient Medications Ordered in Epic   Medication Sig Dispense Refill   • PRENATAL VIT W/ FE BISG-FA PO Take  by mouth.     • norethindrone (MICRONOR) 0.35 MG tablet Take 1 Tab by mouth every day. (Patient not taking: Reported on 2020) 28 Tab 11   • ibuprofen (MOTRIN) 600 MG Tab Take 1 Tab by mouth every 6 hours as needed (For cramping after delivery; do not give if patient is receiving ketorolac (Toradol)). (Patient not taking: Reported on 2020) 30 Tab 1   • ferrous sulfate 325 (65 Fe)  MG tablet Take 1 Tab by mouth every day. (Patient not taking: Reported on 12/14/2020) 30 Tab 3     No current Epic-ordered facility-administered medications on file.        Allergies: Patient has no known allergies.    Social History:  Social History     Socioeconomic History   • Marital status: Single     Spouse name: Not on file   • Number of children: Not on file   • Years of education: Not on file   • Highest education level: Not on file   Occupational History   • Not on file   Social Needs   • Financial resource strain: Not on file   • Food insecurity     Worry: Not on file     Inability: Not on file   • Transportation needs     Medical: Not on file     Non-medical: Not on file   Tobacco Use   • Smoking status: Never Smoker   • Smokeless tobacco: Never Used   Substance and Sexual Activity   • Alcohol use: Not Currently   • Drug use: No   • Sexual activity: Yes     Partners: Male     Comment: none. unplanned pregnancy   Lifestyle   • Physical activity     Days per week: Not on file     Minutes per session: Not on file   • Stress: Not on file   Relationships   • Social connections     Talks on phone: Not on file     Gets together: Not on file     Attends [employer] service: Not on file     Active member of club or organization: Not on file     Attends meetings of clubs or organizations: Not on file     Relationship status: Not on file   • Intimate partner violence     Fear of current or ex partner: Not on file     Emotionally abused: Not on file     Physically abused: Not on file     Forced sexual activity: Not on file   Other Topics Concern   • Not on file   Social History Narrative   • Not on file       Family History:  Family History   Problem Relation Age of Onset   • No Known Problems Mother    • No Known Problems Father    • No Known Problems Brother    • Heart Disease Maternal Grandmother            Objective:   Vitals:  /76 (BP Location: Right arm, Patient Position: Sitting)   Wt 70.3 kg (155 lb)    Body mass index is 25.79 kg/m². (Goal BM I>18 <25)  Exam:  General: appears stated age, is in no apparent distress, is well developed and well nourished  Head: normocephalic, non-tender  Neck: neck is supple  Abdomen: Bowel sounds positive, nondistended, soft, nontender x4, no rebound or guarding. No organomegaly. No masses.   Skin: No rashes, or ulcers or lesions seen  Psychiatric: appropriate affect, alert and oriented x3, intact judgment and insight.    Procedure:  Abdominal TransvaginalUS performed by me and per my read:    Indication: amenorrhea.     Findings: henry intrauterine pregnancy @ 12w2d by CRL. Positive yolk sac. Positive fetal cardiac activity @ 166 BPM. Right ovary not seen. Left Ovary not seen. Cervical length not seen. No free fluid in the cul-de-sac.    Impression: viable IUP @ 12w2d.  VICTORIA by US of 21.    No results found for this or any previous visit (from the past 336 hour(s)).   Pregnancy exam/test positive    A/P:   24 y.o.  here for   1. DUB (dysfunctional uterine bleeding)  POCT Pregnancy       #Missed menses - amenorrhea due to pregnancy.  US today is c/w LMP. VICTORIA of 21.  Discussed pregnancy with patient who is happy.    --Normal pregnancy s/s discussed  --Advised prenatal vitamins, healthy well rounded diet, adequate hydration, and continued exercise.    #Cervical cancer screening.  Last pap unknown, will need at NOB.    #Will order prenatal labs and any additional imaging/testing needed at new OB visit  #SAB precautions discussed.  #Discussed the size of our practice and that she will see multiple providers during the course of her care. She expresses understanding.   #Follow up in 2-4 weeks for new OB visit.    30 minutes were spent in face-to-face patient contact with patient, greater than 50% of which was was spent in counseling and coordination of care for her newly diagnosed pregnancy including medical and surgical options of care.    ROBEL

## 2020-12-14 NOTE — LETTER
December 14, 2020       Patient: Kadi Soliman   YOB: 1995   Date of Visit: 12/14/2020         To Whom It May Concern:    In my medical opinion, I recommend that Kadi Soliman not lift greater than 15 lbs and be allowed to sit for at least 10 minutes every two hours.     If you have any questions or concerns, please don't hesitate to call [phone]          Sincerely,          Cindy Baker D.O.  Electronically Signed

## 2021-01-19 ENCOUNTER — HOSPITAL ENCOUNTER (OUTPATIENT)
Facility: MEDICAL CENTER | Age: 26
End: 2021-01-19
Attending: OBSTETRICS & GYNECOLOGY
Payer: OTHER GOVERNMENT

## 2021-01-19 ENCOUNTER — INITIAL PRENATAL (OUTPATIENT)
Dept: OBGYN | Facility: CLINIC | Age: 26
End: 2021-01-19
Payer: COMMERCIAL

## 2021-01-19 VITALS
BODY MASS INDEX: 26.02 KG/M2 | HEIGHT: 65 IN | HEART RATE: 86 BPM | DIASTOLIC BLOOD PRESSURE: 90 MMHG | SYSTOLIC BLOOD PRESSURE: 126 MMHG | WEIGHT: 156.2 LBS

## 2021-01-19 DIAGNOSIS — O09.292 HX OF PREECLAMPSIA, PRIOR PREGNANCY, CURRENTLY PREGNANT, SECOND TRIMESTER: ICD-10-CM

## 2021-01-19 DIAGNOSIS — O09.92 HIGH-RISK PREGNANCY IN SECOND TRIMESTER: ICD-10-CM

## 2021-01-19 DIAGNOSIS — O09.292 HX OF POSTPARTUM HEMORRHAGE, CURRENTLY PREGNANT, SECOND TRIMESTER: ICD-10-CM

## 2021-01-19 DIAGNOSIS — Z92.89 HX OF TRANSFUSION OF PACKED RED BLOOD CELLS: ICD-10-CM

## 2021-01-19 PROCEDURE — 87491 CHLMYD TRACH DNA AMP PROBE: CPT

## 2021-01-19 PROCEDURE — 59402 PR NEW OB HIGH RISK: CPT | Performed by: OBSTETRICS & GYNECOLOGY

## 2021-01-19 PROCEDURE — 87591 N.GONORRHOEAE DNA AMP PROB: CPT

## 2021-01-19 PROCEDURE — 88175 CYTOPATH C/V AUTO FLUID REDO: CPT

## 2021-01-19 ASSESSMENT — EDINBURGH POSTNATAL DEPRESSION SCALE (EPDS)
I HAVE BEEN SO UNHAPPY THAT I HAVE HAD DIFFICULTY SLEEPING: YES, SOMETIMES
I HAVE BLAMED MYSELF UNNECESSARILY WHEN THINGS WENT WRONG: NO, NEVER
THINGS HAVE BEEN GETTING ON TOP OF ME: YES, SOMETIMES I HAVEN'T BEEN COPING AS WELL AS USUAL
I HAVE FELT SCARED OR PANICKY FOR NO GOOD REASON: YES, SOMETIMES
I HAVE FELT SAD OR MISERABLE: YES, QUITE OFTEN
TOTAL SCORE: 12
I HAVE BEEN ABLE TO LAUGH AND SEE THE FUNNY SIDE OF THINGS: AS MUCH AS I ALWAYS COULD
I HAVE BEEN SO UNHAPPY THAT I HAVE BEEN CRYING: ONLY OCCASIONALLY
I HAVE BEEN ANXIOUS OR WORRIED FOR NO GOOD REASON: YES, VERY OFTEN
I HAVE LOOKED FORWARD WITH ENJOYMENT TO THINGS: AS MUCH AS I EVER DID
THE THOUGHT OF HARMING MYSELF HAS OCCURRED TO ME: NEVER

## 2021-01-19 NOTE — PROGRESS NOTES
Pt here for NOB visit  LMP: 9/23/2020     : 12/14/2020   VICTORIA:6/26/2021  Good Phone #:[phone]  Pharmacy verified.  Last Pap:LP:4/12/17, NEG  Pt declines CF.  Pt desires AFP.  Pt states having mild cramping for on and off. Pt states having vaginal px once in a while.  Pt states no other complaints for today.  Pt desires Flu on next visit.   EPDS:12

## 2021-01-19 NOTE — PROGRESS NOTES
"Subjective:      Kadi Soliman is a 25 y.o. female who presents for new OB exam            HPI patient is a 25-year-old G2, P1 at 17 weeks and 3 days gestation based on ultrasound who presents today for new OB exam.  Patient is doing well except for some mild cramping once in a while.  She reports normal bowel and bladder functions.  Denies headaches.  Reports no pain,bleeding or spotting.  Patient is taking prenatal vitamins    Patient had history of  a couple years ago and states she had delayed postpartum hemorrhage and had blood transfusion the day after delivery.  She reports that her pregnancy was complicated with preeclampsia and she has already started baby aspirin daily.    She reports no history of PID or STDs.  She reports no family history of any known birth defects or chromosomal disorders    ROS all organ systems are reviewed and were negative except for complaints in HPI       Objective:     /90   Ht 1.651 m (5' 5\")   Wt 70.9 kg (156 lb 3.2 oz)   LMP 2020 (Exact Date)   BMI 25.99 kg/m²      Physical Exam  Vitals signs and nursing note reviewed. Exam conducted with a chaperone present.   Constitutional:       General: She is not in acute distress.     Appearance: Normal appearance. She is normal weight. She is not toxic-appearing.   HENT:      Head: Normocephalic and atraumatic.   Eyes:      General:         Right eye: No discharge.         Left eye: No discharge.      Conjunctiva/sclera: Conjunctivae normal.   Neck:      Musculoskeletal: Normal range of motion and neck supple. No neck rigidity.   Cardiovascular:      Rate and Rhythm: Normal rate and regular rhythm.      Pulses: Normal pulses.      Heart sounds: Normal heart sounds. No murmur. No gallop.    Pulmonary:      Effort: Pulmonary effort is normal. No respiratory distress.      Breath sounds: Normal breath sounds. No wheezing.   Chest:      Breasts:         Right: No swelling, bleeding, inverted nipple, mass, " nipple discharge, skin change or tenderness.         Left: No swelling, bleeding, inverted nipple, mass, nipple discharge, skin change or tenderness.   Abdominal:      General: Abdomen is flat. Bowel sounds are normal. There is no distension.      Palpations: Abdomen is soft.      Tenderness: There is no abdominal tenderness.   Genitourinary:     General: Normal vulva.      Labia:         Right: No rash, tenderness, lesion or injury.         Left: No rash, tenderness, lesion or injury.       Urethra: No prolapse.      Vagina: Vaginal discharge (Small amount of grayish discharge present) present. No tenderness or bleeding.      Cervix: No cervical motion tenderness, friability or erythema.      Uterus: Enlarged. Not tender.       Adnexa:         Right: No mass, tenderness or fullness.          Left: No mass, tenderness or fullness.        Rectum: No external hemorrhoid.   Musculoskeletal: Normal range of motion.         General: No swelling.      Right lower leg: No edema.      Left lower leg: No edema.   Lymphadenopathy:      Cervical: No cervical adenopathy.      Upper Body:      Right upper body: No supraclavicular or axillary adenopathy.      Left upper body: No supraclavicular or axillary adenopathy.      Lower Body: No right inguinal adenopathy. No left inguinal adenopathy.   Skin:     General: Skin is warm and dry.      Findings: No lesion.   Neurological:      General: No focal deficit present.      Mental Status: She is alert and oriented to person, place, and time.      Gait: Gait normal.   Psychiatric:         Mood and Affect: Mood normal.         Behavior: Behavior normal.         Thought Content: Thought content normal.         Judgment: Judgment normal.                 Assessment/Plan:        1. High-risk pregnancy in second trimester  25-year-old  at 17 weeks and 3 days here for new OB exam.  Exam is within normal limits  Pregnancy precautions and restrictions reviewed  We discussed exercise and  lifting restriction, recommended fluid and caloric intake, recommended weight gain in pregnancy reviewed  We discussed screening for birth defects chromosomal disorders and genetic problems in pregnancy and I discussed various testing options available.  She desires quad screen today and declined any other screening including CF and SMA  - PRENATAL PANEL 3+HIV+HCV; Future  - THINPREP RFLX HPV ASCUS W/CTNG; Future  - URINE DRUG SCREEN W/CONF (AR); Future  - US-OB 2ND 3RD TRI COMPLETE; Future  - AFP TETRA; Future    2. Hx of preeclampsia, prior pregnancy, currently pregnant, second trimester  We discussed risk of recurrence.  Patient encouraged to continue baby aspirin daily    3. Hx of postpartum hemorrhage, currently pregnant, second trimester  We reviewed history with patient    4. Hx of transfusion of packed red blood cells  History was reviewed    5.  Pregnancy precautions and plan of care were discussed.  New OB packet given.  Patient to follow-up in 4 weeks for OB care.

## 2021-01-19 NOTE — LETTER
January 19, 2021            Kadi Soliman is currently being cared for at [facility].  This patient is pregnant and may continue to work.  She should not lift greater than 20 pounds and requires frequent rest periods (10 minutes every two hours).        Thank you,          Alberto Terry M.D., FACOG

## 2021-01-20 ENCOUNTER — HOSPITAL ENCOUNTER (OUTPATIENT)
Dept: LAB | Facility: MEDICAL CENTER | Age: 26
End: 2021-01-20
Attending: OBSTETRICS & GYNECOLOGY
Payer: OTHER GOVERNMENT

## 2021-01-20 DIAGNOSIS — O09.92 HIGH-RISK PREGNANCY IN SECOND TRIMESTER: ICD-10-CM

## 2021-01-20 LAB
ABO GROUP BLD: NORMAL
BASOPHILS # BLD AUTO: 0.2 % (ref 0–1.8)
BASOPHILS # BLD: 0.02 K/UL (ref 0–0.12)
BLD GP AB SCN SERPL QL: NORMAL
EOSINOPHIL # BLD AUTO: 0.02 K/UL (ref 0–0.51)
EOSINOPHIL NFR BLD: 0.2 % (ref 0–6.9)
ERYTHROCYTE [DISTWIDTH] IN BLOOD BY AUTOMATED COUNT: 42 FL (ref 35.9–50)
HBV SURFACE AG SER QL: ABNORMAL
HCT VFR BLD AUTO: 39.3 % (ref 37–47)
HCV AB SER QL: ABNORMAL
HGB BLD-MCNC: 12.9 G/DL (ref 12–16)
HIV 1+2 AB+HIV1 P24 AG SERPL QL IA: NORMAL
IMM GRANULOCYTES # BLD AUTO: 0.06 K/UL (ref 0–0.11)
IMM GRANULOCYTES NFR BLD AUTO: 0.7 % (ref 0–0.9)
LYMPHOCYTES # BLD AUTO: 2.24 K/UL (ref 1–4.8)
LYMPHOCYTES NFR BLD: 24.7 % (ref 22–41)
MCH RBC QN AUTO: 27.8 PG (ref 27–33)
MCHC RBC AUTO-ENTMCNC: 32.8 G/DL (ref 33.6–35)
MCV RBC AUTO: 84.7 FL (ref 81.4–97.8)
MONOCYTES # BLD AUTO: 0.4 K/UL (ref 0–0.85)
MONOCYTES NFR BLD AUTO: 4.4 % (ref 0–13.4)
NEUTROPHILS # BLD AUTO: 6.32 K/UL (ref 2–7.15)
NEUTROPHILS NFR BLD: 69.8 % (ref 44–72)
NRBC # BLD AUTO: 0 K/UL
NRBC BLD-RTO: 0 /100 WBC
PLATELET # BLD AUTO: 259 K/UL (ref 164–446)
PMV BLD AUTO: 12.1 FL (ref 9–12.9)
RBC # BLD AUTO: 4.64 M/UL (ref 4.2–5.4)
RH BLD: NORMAL
RUBV AB SER QL: 9.68 IU/ML
TREPONEMA PALLIDUM IGG+IGM AB [PRESENCE] IN SERUM OR PLASMA BY IMMUNOASSAY: ABNORMAL
WBC # BLD AUTO: 9.1 K/UL (ref 4.8–10.8)

## 2021-01-20 PROCEDURE — 80307 DRUG TEST PRSMV CHEM ANLYZR: CPT

## 2021-01-20 PROCEDURE — 86900 BLOOD TYPING SEROLOGIC ABO: CPT

## 2021-01-20 PROCEDURE — 87389 HIV-1 AG W/HIV-1&-2 AB AG IA: CPT

## 2021-01-20 PROCEDURE — 86850 RBC ANTIBODY SCREEN: CPT

## 2021-01-20 PROCEDURE — 36415 COLL VENOUS BLD VENIPUNCTURE: CPT

## 2021-01-20 PROCEDURE — 86780 TREPONEMA PALLIDUM: CPT

## 2021-01-20 PROCEDURE — 86901 BLOOD TYPING SEROLOGIC RH(D): CPT

## 2021-01-20 PROCEDURE — 85025 COMPLETE CBC W/AUTO DIFF WBC: CPT

## 2021-01-20 PROCEDURE — 81511 FTL CGEN ABNOR FOUR ANAL: CPT

## 2021-01-20 PROCEDURE — 86803 HEPATITIS C AB TEST: CPT

## 2021-01-20 PROCEDURE — 87340 HEPATITIS B SURFACE AG IA: CPT

## 2021-01-20 PROCEDURE — 86762 RUBELLA ANTIBODY: CPT

## 2021-01-21 DIAGNOSIS — Z28.39 RUBELLA NON-IMMUNE STATUS, ANTEPARTUM: ICD-10-CM

## 2021-01-21 DIAGNOSIS — O09.899 RUBELLA NON-IMMUNE STATUS, ANTEPARTUM: ICD-10-CM

## 2021-01-22 LAB
# FETUSES US: NORMAL
AFP MOM SERPL: 1.1
AFP SERPL-MCNC: 43 NG/ML
AGE - REPORTED: 25.5 YR
AMPHET CTO UR CFM-MCNC: NEGATIVE NG/ML
BARBITURATES CTO UR CFM-MCNC: NEGATIVE NG/ML
BENZODIAZ CTO UR CFM-MCNC: NEGATIVE NG/ML
CANNABINOIDS CTO UR CFM-MCNC: NEGATIVE NG/ML
COCAINE CTO UR CFM-MCNC: NEGATIVE NG/ML
CURRENT SMOKER: NO
DRUG COMMENT 753798: NORMAL
FAMILY MEMBER DISEASES HX: NO
GA METHOD: NORMAL
GA: NORMAL WK
HCG MOM SERPL: 1.18
HCG SERPL-ACNC: NORMAL IU/L
HX OF HEREDITARY DISORDERS: NO
IDDM PATIENT QL: NO
INHIBIN A MOM SERPL: 0.92
INHIBIN A SERPL-MCNC: 140 PG/ML
INTEGRATED SCN PATIENT-IMP: NORMAL
METHADONE CTO UR CFM-MCNC: NEGATIVE NG/ML
OPIATES CTO UR CFM-MCNC: NEGATIVE NG/ML
PATHOLOGY STUDY: NORMAL
PCP CTO UR CFM-MCNC: NEGATIVE NG/ML
PROPOXYPH CTO UR CFM-MCNC: NEGATIVE NG/ML
SPECIMEN DRAWN SERPL: NORMAL
U ESTRIOL MOM SERPL: 0.81
U ESTRIOL SERPL-MCNC: 1.31 NG/ML

## 2021-01-25 ENCOUNTER — TELEPHONE (OUTPATIENT)
Dept: OBGYN | Facility: CLINIC | Age: 26
End: 2021-01-25

## 2021-01-25 NOTE — TELEPHONE ENCOUNTER
----- Message from Alberto Terry M.D. sent at 1/22/2021  7:21 PM PST -----  Negative Down syndrome screening      1/25/2021 1124 Left message for pt to call back regarding lab results.   1/27/2021 1245 pt notified as above. Pt verbalized understanding.

## 2021-02-18 ENCOUNTER — ROUTINE PRENATAL (OUTPATIENT)
Dept: OBGYN | Facility: CLINIC | Age: 26
End: 2021-02-18
Payer: COMMERCIAL

## 2021-02-18 VITALS — WEIGHT: 162 LBS | BODY MASS INDEX: 26.96 KG/M2 | DIASTOLIC BLOOD PRESSURE: 89 MMHG | SYSTOLIC BLOOD PRESSURE: 137 MMHG

## 2021-02-18 DIAGNOSIS — Z34.82 ENCOUNTER FOR SUPERVISION OF OTHER NORMAL PREGNANCY, SECOND TRIMESTER: ICD-10-CM

## 2021-02-18 DIAGNOSIS — O09.892 SUPERVISION OF OTHER HIGH RISK PREGNANCIES, SECOND TRIMESTER: Primary | ICD-10-CM

## 2021-02-18 DIAGNOSIS — O09.299 HX OF PREECLAMPSIA, PRIOR PREGNANCY, CURRENTLY PREGNANT: ICD-10-CM

## 2021-02-18 PROCEDURE — 90040 PR PRENATAL FOLLOW UP: CPT | Performed by: NURSE PRACTITIONER

## 2021-02-18 RX ORDER — ASPIRIN 81 MG/1
81 TABLET, CHEWABLE ORAL DAILY
COMMUNITY
End: 2021-08-24

## 2021-02-18 NOTE — PROGRESS NOTES
S) Pt is a 25 y.o.   at 21w5d  gestation. Routine prenatal care today. Having some rectal and vaginal pain, especially when up and moving around, otherwise feeling well.   Fetal movement Normal  Cramping no  VB no  LOF no   Denies dysuria. Generally feels well today. Good self-care activities identified. Denies headaches, swelling, visual changes, or epigastric pain .     O) See flow sheet for vital signs and fetal measurements.          Labs:       PNL: RNI        GCT:        AFP: normal       GBS: N/A       Pertinent ultrasound - has not scheduled           A) IUP at 21w5d       S=D         Patient Active Problem List    Diagnosis Date Noted   • Rubella non-immune status, antepartum 2021   • High-risk pregnancy in second trimester 2021   • Hx of preeclampsia, prior pregnancy, currently pregnant, second trimester 2021   • Hx of postpartum hemorrhage, currently pregnant, second trimester 2021   • Hx of transfusion of packed red blood cells 2021          SVE: deferred         TDAP: no       FLU: no        BTL: no       : no       C/S Consent: no       IOL or C/S scheduled: no       LAST PAP: 2021 negative         P) s/s ptl vs general discomforts. Fetal movements reviewed. General ed and anticipatory guidance. Nutrition/exercise/vitamin. Plans breast Plans pp contraception- unsure  Continue PNV.   To schedule anatomy scan.   Given pre-e labs to do   Will return next week for BP check  Continue baby ASA daily  Discussed warning s/s of pre-e and need to f/u  Pregnancy belt, tylenol, heat, ice, stretching, baths for pelvic pain.

## 2021-02-19 NOTE — PROGRESS NOTES
OB follow up   + fetal movement.  No VB, LOF or UC's.  Flu vaccine offered would like to asked again at next visit   Phone # [phone]  Preferred pharmacy confirmed.  Pt c/o sharp pain in rectal area, 3 times per week  x3 weeks   AFP Negative

## 2021-02-24 ENCOUNTER — HOSPITAL ENCOUNTER (OUTPATIENT)
Dept: LAB | Facility: MEDICAL CENTER | Age: 26
End: 2021-02-24
Attending: NURSE PRACTITIONER
Payer: COMMERCIAL

## 2021-02-24 DIAGNOSIS — O09.299 HX OF PREECLAMPSIA, PRIOR PREGNANCY, CURRENTLY PREGNANT: ICD-10-CM

## 2021-02-24 PROCEDURE — 80053 COMPREHEN METABOLIC PANEL: CPT

## 2021-02-24 PROCEDURE — 36415 COLL VENOUS BLD VENIPUNCTURE: CPT

## 2021-02-25 ENCOUNTER — HOSPITAL ENCOUNTER (OUTPATIENT)
Facility: MEDICAL CENTER | Age: 26
End: 2021-02-25
Attending: NURSE PRACTITIONER
Payer: COMMERCIAL

## 2021-02-25 DIAGNOSIS — O09.299 HX OF PREECLAMPSIA, PRIOR PREGNANCY, CURRENTLY PREGNANT: ICD-10-CM

## 2021-02-25 LAB
ALBUMIN SERPL BCP-MCNC: 3.6 G/DL (ref 3.2–4.9)
ALBUMIN/GLOB SERPL: 1 G/DL
ALP SERPL-CCNC: 68 U/L (ref 30–99)
ALT SERPL-CCNC: 13 U/L (ref 2–50)
ANION GAP SERPL CALC-SCNC: 11 MMOL/L (ref 7–16)
AST SERPL-CCNC: 13 U/L (ref 12–45)
BILIRUB SERPL-MCNC: 0.2 MG/DL (ref 0.1–1.5)
BUN SERPL-MCNC: 9 MG/DL (ref 8–22)
CALCIUM SERPL-MCNC: 9 MG/DL (ref 8.5–10.5)
CHLORIDE SERPL-SCNC: 102 MMOL/L (ref 96–112)
CO2 SERPL-SCNC: 21 MMOL/L (ref 20–33)
CREAT SERPL-MCNC: 0.54 MG/DL (ref 0.5–1.4)
GLOBULIN SER CALC-MCNC: 3.5 G/DL (ref 1.9–3.5)
GLUCOSE SERPL-MCNC: 94 MG/DL (ref 65–99)
POTASSIUM SERPL-SCNC: 3.7 MMOL/L (ref 3.6–5.5)
PROT 24H UR-MCNC: 76 MG/24 HR (ref 30–150)
PROT 24H UR-MRATE: 8 MG/DL (ref 0–15)
PROT SERPL-MCNC: 7.1 G/DL (ref 6–8.2)
SODIUM SERPL-SCNC: 134 MMOL/L (ref 135–145)
SPECIMEN VOL UR: 950 ML

## 2021-02-25 PROCEDURE — 84156 ASSAY OF PROTEIN URINE: CPT

## 2021-02-25 PROCEDURE — 81050 URINALYSIS VOLUME MEASURE: CPT

## 2021-02-26 ENCOUNTER — NON-PROVIDER VISIT (OUTPATIENT)
Dept: OBGYN | Facility: CLINIC | Age: 26
End: 2021-02-26
Payer: COMMERCIAL

## 2021-02-26 VITALS — SYSTOLIC BLOOD PRESSURE: 120 MMHG | DIASTOLIC BLOOD PRESSURE: 81 MMHG

## 2021-03-01 ENCOUNTER — APPOINTMENT (OUTPATIENT)
Dept: RADIOLOGY | Facility: MEDICAL CENTER | Age: 26
End: 2021-03-01
Attending: NURSE PRACTITIONER
Payer: COMMERCIAL

## 2021-03-26 ENCOUNTER — OFFICE VISIT (OUTPATIENT)
Dept: URGENT CARE | Facility: PHYSICIAN GROUP | Age: 26
End: 2021-03-26
Payer: MEDICAID

## 2021-03-26 VITALS
RESPIRATION RATE: 16 BRPM | HEART RATE: 106 BPM | SYSTOLIC BLOOD PRESSURE: 118 MMHG | BODY MASS INDEX: 29.16 KG/M2 | HEIGHT: 64 IN | WEIGHT: 170.8 LBS | TEMPERATURE: 98.8 F | OXYGEN SATURATION: 98 % | DIASTOLIC BLOOD PRESSURE: 78 MMHG

## 2021-03-26 DIAGNOSIS — H61.22 IMPACTED CERUMEN OF LEFT EAR: ICD-10-CM

## 2021-03-26 PROCEDURE — 99203 OFFICE O/P NEW LOW 30 MIN: CPT | Performed by: NURSE PRACTITIONER

## 2021-03-26 ASSESSMENT — ENCOUNTER SYMPTOMS
SORE THROAT: 0
CHILLS: 0
VOMITING: 0
FEVER: 0
HEADACHES: 0
NAUSEA: 0
DIZZINESS: 0

## 2021-03-26 ASSESSMENT — FIBROSIS 4 INDEX: FIB4 SCORE: 0.35

## 2021-03-27 NOTE — PROGRESS NOTES
"Subjective:   Kadi Soliman is a 25 y.o. female who presents for Otalgia ((L) earache-loss of hearing x 1 week )      HPI  25-year-old female patient in urgent care for ongoing left ear pain and hearing loss for 1 week.  Patient was treated with antibiotics for ear infection but has not developed loss of hearing.  Denies fever, chills, nausea, vomiting, dizziness or headache.  Has been using over-the-counter wax softening drops    Review of Systems   Constitutional: Negative for chills and fever.   HENT: Positive for hearing loss. Negative for congestion, ear discharge, ear pain, sore throat and tinnitus.    Gastrointestinal: Negative for nausea and vomiting.   Neurological: Negative for dizziness and headaches.       Patient Active Problem List   Diagnosis   • High-risk pregnancy in second trimester   • Hx of preeclampsia, prior pregnancy, currently pregnant, second trimester   • Hx of postpartum hemorrhage, currently pregnant, second trimester   • Hx of transfusion of packed red blood cells   • Rubella non-immune status, antepartum     History reviewed. No pertinent surgical history.  Social History     Tobacco Use   • Smoking status: Never Smoker   • Smokeless tobacco: Never Used   Substance Use Topics   • Alcohol use: Not Currently   • Drug use: No      Family History   Problem Relation Age of Onset   • No Known Problems Mother    • No Known Problems Father    • No Known Problems Brother    • Heart Disease Maternal Grandmother       (Allergies, Medications, & Tobacco/Substance Use were reconciled by the Medical Assistant and reviewed by myself. The family history is prepopulated)     Objective:     /78   Pulse (!) 106   Temp 37.1 °C (98.8 °F)   Resp 16   Ht 1.626 m (5' 4\")   Wt 77.5 kg (170 lb 12.8 oz) Comment: pt pregnant  LMP 09/23/2020 (Exact Date)   SpO2 98%   BMI 29.32 kg/m²     Physical Exam  Vitals reviewed.   Constitutional:       Appearance: Normal appearance.   HENT:      Right " Ear: Tympanic membrane, ear canal and external ear normal.      Left Ear: There is impacted cerumen.   Cardiovascular:      Rate and Rhythm: Normal rate and regular rhythm.      Heart sounds: Normal heart sounds.   Pulmonary:      Effort: Pulmonary effort is normal.      Breath sounds: Normal breath sounds.   Skin:     General: Skin is warm.   Neurological:      Mental Status: She is alert and oriented to person, place, and time.   Psychiatric:         Mood and Affect: Mood normal.         Behavior: Behavior normal.         Thought Content: Thought content normal.         Judgment: Judgment normal.         Assessment/Plan:     1. Impacted cerumen of left ear       Warm water lavage performed by MA and large amount of soft wax removed. Tm visualized by provider and was WNL without bulging, erythema or retraction. External ear canals are WNL     Patient tolerated procedure without difficulty     Differential diagnosis, natural history, supportive care, and indications for immediate follow-up discussed.    Advised the patient to follow-up with the primary care physician for recheck, reevaluation, and consideration of further management.    Please note that this dictation was created using voice recognition software. I have made a reasonable attempt to correct obvious errors, but I expect that there are errors of grammar and possibly content that I did not discover before finalizing the note.    This note was electronically signed TEODORO Yi

## 2021-03-29 ENCOUNTER — ROUTINE PRENATAL (OUTPATIENT)
Dept: OBGYN | Facility: CLINIC | Age: 26
End: 2021-03-29
Payer: MEDICAID

## 2021-03-29 VITALS — WEIGHT: 170 LBS | DIASTOLIC BLOOD PRESSURE: 78 MMHG | BODY MASS INDEX: 29.18 KG/M2 | SYSTOLIC BLOOD PRESSURE: 124 MMHG

## 2021-03-29 DIAGNOSIS — O09.92 HIGH-RISK PREGNANCY IN SECOND TRIMESTER: Primary | ICD-10-CM

## 2021-03-29 PROCEDURE — 90471 IMMUNIZATION ADMIN: CPT | Performed by: NURSE PRACTITIONER

## 2021-03-29 PROCEDURE — 90040 PR PRENATAL FOLLOW UP: CPT | Performed by: NURSE PRACTITIONER

## 2021-03-29 PROCEDURE — 90715 TDAP VACCINE 7 YRS/> IM: CPT | Performed by: NURSE PRACTITIONER

## 2021-03-29 ASSESSMENT — FIBROSIS 4 INDEX: FIB4 SCORE: 0.35

## 2021-03-29 NOTE — PATIENT INSTRUCTIONS
P:  1.  PP contraception declined BTL.         2.  Instructions given on FKCs.          3.  Questions answered.          4.  Encourage good hand hygiene, social distancing and avoiding sick contacts.        5.  Encourage adequate water intake.        6.  1hr GTT, syphilis and CBC ordered.  Lab slip and instructions given to pt.        7.   labor precautions reviewed.         8.  F/u 2 wks.        9.  TDap given.      10.  D/w pt helps for allergies.

## 2021-03-29 NOTE — PROGRESS NOTES
OB follow up   + fetal movement.  No VB, LOF or UC's.  Phone # [phone]  Preferred pharmacy confirmed.  Kick count sheet given today.  BTL  declined  3rd trimester lab orders pended and instructions given to patient.  TDap given to patient. R  Deltoid. Screening checklist reviewed with patient. VIS given. Verified by AC

## 2021-03-29 NOTE — PROGRESS NOTES
S:  Pt is  at 27w2d for routine OB follow up.  Reports some pretty bad allergies this week.  Reports good FM.  Denies VB, LOF, RUCs or vaginal DC. Denies cough, SOB, sore throat or fever.  Denies exposure to anyone with COVID 19.    O:    Vitals:    21 1455   BP: 124/78   Weight: 77.1 kg (170 lb)           FHTs: 150        Fundal ht: 27 cm.    A:  IUP at 27w2d  Patient Active Problem List    Diagnosis Date Noted   • Rubella non-immune status, antepartum 2021   • High-risk pregnancy in second trimester 2021   • Hx of preeclampsia, prior pregnancy, currently pregnant, second trimester 2021   • Hx of postpartum hemorrhage, currently pregnant, second trimester 2021   • Hx of transfusion of packed red blood cells 2021        P:  1.  PP contraception declined BTL.         2.  Instructions given on FKCs.          3.  Questions answered.          4.  Encourage good hand hygiene, social distancing and avoiding sick contacts.        5.  Encourage adequate water intake.        6.  1hr GTT, syphilis and CBC ordered.  Lab slip and instructions given to pt.        7.   labor precautions reviewed.         8.  F/u 2 wks.        9.  TDap given.      10.  D/w pt helps for allergies.    I have placed the below orders and discussed them with an approved delegating provider. The MA is performing the below orders under the direction of  Dr. Carrero.

## 2021-03-29 NOTE — LETTER
"Count Your Baby's Movements  Another step to a healthy delivery    Kadi Soliman             Dept: [phone]    How Many Weeks Pregnant=27w2d    Date to Begin Countin/3/21              How to use this chart    One way for your physician to keep track of your baby's health is by knowing how often the baby moves (or \"kicks\") in your womb.  You can help your physician to do this by using this chart every day.    Every day, you should see how many hours it takes for your baby to move 10 times.  Start in the morning, as soon as you get up.    · First, write down the time your baby moves until you get to 10.  · Check off one box every time your baby moves until you get to 10.  · Write down the time you finished counting in the last column.  · Total how long it took to count up all 10 movements.  · Finally, fill in the box that shows how long this took.  After counting 10 movements, you no longer have to count any more that day.  The next morning, just start counting again as soon as you get up.    What should you call a \"movement\"?  It is hard to say, because it will feel different from one mother to another and from one pregnancy to the next.  The important thing is that you count the movements the same way throughout your pregnancy.  If you have more questions, you should ask your physician.    Count carefully every day!  SAMPLE:  Week 28    How many hours did it take to feel 10 movements?       Start  Time     1     2     3     4     5     6     7     8     9     10   Finish Time   Mon 8:20 ·  ·  ·  ·  ·  ·  ·  ·  ·  ·  11:40                  Sat               Sun                 IMPORTANT: You should contact your physician if it takes more than two hours for you to feel 10 movements.  Each morning, write down the time and start to count the movements of your baby.  Keep track by checking off one box every time you feel one movement.  When you have felt " "10 \"kicks\", write down the time you finished counting in the last column.  Then fill in the   box (over the check jaciel) for the number of hours it took.  Be sure to read the complete instructions on the previous page.            "

## 2021-04-01 ENCOUNTER — APPOINTMENT (OUTPATIENT)
Dept: RADIOLOGY | Facility: IMAGING CENTER | Age: 26
End: 2021-04-01
Attending: NURSE PRACTITIONER
Payer: MEDICAID

## 2021-04-01 DIAGNOSIS — Z34.82 ENCOUNTER FOR SUPERVISION OF OTHER NORMAL PREGNANCY, SECOND TRIMESTER: ICD-10-CM

## 2021-04-01 PROCEDURE — 76805 OB US >/= 14 WKS SNGL FETUS: CPT | Mod: TC | Performed by: NURSE PRACTITIONER

## 2021-04-08 ENCOUNTER — HOSPITAL ENCOUNTER (OUTPATIENT)
Dept: LAB | Facility: MEDICAL CENTER | Age: 26
End: 2021-04-08
Attending: NURSE PRACTITIONER
Payer: MEDICAID

## 2021-04-08 ENCOUNTER — TELEPHONE (OUTPATIENT)
Dept: OBGYN | Facility: CLINIC | Age: 26
End: 2021-04-08

## 2021-04-08 DIAGNOSIS — O09.92 HIGH-RISK PREGNANCY IN SECOND TRIMESTER: ICD-10-CM

## 2021-04-08 PROCEDURE — 85027 COMPLETE CBC AUTOMATED: CPT

## 2021-04-08 PROCEDURE — 82950 GLUCOSE TEST: CPT

## 2021-04-08 PROCEDURE — 36415 COLL VENOUS BLD VENIPUNCTURE: CPT

## 2021-04-08 PROCEDURE — 86780 TREPONEMA PALLIDUM: CPT

## 2021-04-08 NOTE — TELEPHONE ENCOUNTER
Pt LM on VM stating she had some questions regarding lab work  Spoke with pt, wants to know if she needs to be fasting for 1 hr GTT, pt stated she had breakfast at 7 am (no sugar) today and has her appt at 12 pm. Adv pt she should be fine by her appt and can drink plain water. Pt voiced understanding and will comply

## 2021-04-09 DIAGNOSIS — R73.09 ABNORMAL GTT (GLUCOSE TOLERANCE TEST): Primary | ICD-10-CM

## 2021-04-09 LAB
ERYTHROCYTE [DISTWIDTH] IN BLOOD BY AUTOMATED COUNT: 39 FL (ref 35.9–50)
GLUCOSE 1H P 50 G GLC PO SERPL-MCNC: 144 MG/DL (ref 70–139)
HCT VFR BLD AUTO: 37.5 % (ref 37–47)
HGB BLD-MCNC: 12.2 G/DL (ref 12–16)
MCH RBC QN AUTO: 28.1 PG (ref 27–33)
MCHC RBC AUTO-ENTMCNC: 32.5 G/DL (ref 33.6–35)
MCV RBC AUTO: 86.4 FL (ref 81.4–97.8)
PLATELET # BLD AUTO: 261 K/UL (ref 164–446)
PMV BLD AUTO: 11.9 FL (ref 9–12.9)
RBC # BLD AUTO: 4.34 M/UL (ref 4.2–5.4)
TREPONEMA PALLIDUM IGG+IGM AB [PRESENCE] IN SERUM OR PLASMA BY IMMUNOASSAY: NORMAL
WBC # BLD AUTO: 9.2 K/UL (ref 4.8–10.8)

## 2021-04-13 ENCOUNTER — TELEPHONE (OUTPATIENT)
Dept: OBGYN | Facility: CLINIC | Age: 26
End: 2021-04-13

## 2021-04-13 ENCOUNTER — ROUTINE PRENATAL (OUTPATIENT)
Dept: OBGYN | Facility: CLINIC | Age: 26
End: 2021-04-13
Payer: MEDICAID

## 2021-04-13 VITALS — DIASTOLIC BLOOD PRESSURE: 74 MMHG | BODY MASS INDEX: 29.87 KG/M2 | SYSTOLIC BLOOD PRESSURE: 124 MMHG | WEIGHT: 174 LBS

## 2021-04-13 DIAGNOSIS — R73.09 ABNORMAL GTT (GLUCOSE TOLERANCE TEST): ICD-10-CM

## 2021-04-13 DIAGNOSIS — O09.893 SUPERVISION OF OTHER HIGH RISK PREGNANCIES, THIRD TRIMESTER: Primary | ICD-10-CM

## 2021-04-13 PROBLEM — O09.92 HIGH-RISK PREGNANCY IN SECOND TRIMESTER: Status: RESOLVED | Noted: 2021-01-19 | Resolved: 2021-04-13

## 2021-04-13 PROCEDURE — 90040 PR PRENATAL FOLLOW UP: CPT | Performed by: NURSE PRACTITIONER

## 2021-04-13 ASSESSMENT — FIBROSIS 4 INDEX: FIB4 SCORE: 0.35

## 2021-04-13 NOTE — PROGRESS NOTES
OB follow up   + fetal movement.  No VB, LOF or UC's.  Phone # [phone]  Preferred pharmacy confirmed.  Order for 3 gtt given

## 2021-04-13 NOTE — PROGRESS NOTES
S:  Pt is  at 29w3d for routine OB follow up.  Still having some sinus issues.  Has been feeling some cramping throughout the day.  Walks a lot.  Reports good FM.  Denies VB, LOF, RUCs or vaginal DC.  Denies cough, SOB, sore throat or fever.      O:    Vitals:    21 1551   BP: 124/74   Weight: 78.9 kg (174 lb)           FHTs: 147        Fundal ht: 31 cm.        1hr GTT: abnormal -- reviewed w pt.    A:  IUP at 29w3d  Patient Active Problem List    Diagnosis Date Noted   • Supervision of other high risk pregnancies, third trimester 2021   • Abnormal GTT (glucose tolerance test) 2021   • Rubella non-immune status, antepartum 2021   • Hx of preeclampsia, prior pregnancy, currently pregnant, second trimester 2021   • Hx of postpartum hemorrhage, currently pregnant, second trimester 2021   • Hx of transfusion of packed red blood cells 2021        P:  1.  Encouraged pt to complete 3hr GTT asap.        2.  Continue FKCs.          3.  Questions answered.          4.  Reviewed L&D policies.          5.  Encourage adequate water intake.        6.  F/u 2 wks.        7.  Tdap already given.

## 2021-04-13 NOTE — TELEPHONE ENCOUNTER
----- Message from SILAS Hdez sent at 4/9/2021  8:24 AM PDT -----  Abnromal gtt > 3hr ordered      Pt notified of abnormal 1hr gtt and need to do 3hr gtt this time. Pt instructed to fast 10-12hrs prior to testing. Pt informed she is only allow to drink plain water during fasting time. Pt will call lab to schedule an appt. Advised to bring a snack for after the test is done. Pt notified will be staying in the labs for the 3hr. Pt agreed to do it sometimes this week. Pt verbalized understanding.

## 2021-04-13 NOTE — PATIENT INSTRUCTIONS
P:  1.  Encouraged pt to complete 3hr GTT asap.        2.  Continue FKCs.          3.  Questions answered.          4.  Reviewed L&D policies.          5.  Encourage adequate water intake.        6.  F/u 2 wks.        7.  Tdap already given.

## 2021-04-27 ENCOUNTER — HOSPITAL ENCOUNTER (OUTPATIENT)
Dept: LAB | Facility: MEDICAL CENTER | Age: 26
End: 2021-04-27
Attending: NURSE PRACTITIONER
Payer: MEDICAID

## 2021-04-27 DIAGNOSIS — R73.09 ABNORMAL GTT (GLUCOSE TOLERANCE TEST): ICD-10-CM

## 2021-04-27 PROCEDURE — 82951 GLUCOSE TOLERANCE TEST (GTT): CPT

## 2021-04-27 PROCEDURE — 82952 GTT-ADDED SAMPLES: CPT

## 2021-04-27 PROCEDURE — 36415 COLL VENOUS BLD VENIPUNCTURE: CPT

## 2021-04-28 LAB
GLUCOSE 1H P CHAL SERPL-MCNC: 151 MG/DL (ref 65–180)
GLUCOSE 2H P CHAL SERPL-MCNC: 126 MG/DL (ref 65–155)
GLUCOSE 3H P CHAL SERPL-MCNC: 104 MG/DL (ref 65–140)
GLUCOSE BS SERPL-MCNC: 87 MG/DL (ref 65–95)

## 2021-04-29 ENCOUNTER — ROUTINE PRENATAL (OUTPATIENT)
Dept: OBGYN | Facility: CLINIC | Age: 26
End: 2021-04-29
Payer: MEDICAID

## 2021-04-29 ENCOUNTER — HOSPITAL ENCOUNTER (OUTPATIENT)
Facility: MEDICAL CENTER | Age: 26
End: 2021-04-29
Attending: NURSE PRACTITIONER
Payer: MEDICAID

## 2021-04-29 VITALS — BODY MASS INDEX: 29.87 KG/M2 | SYSTOLIC BLOOD PRESSURE: 120 MMHG | WEIGHT: 174 LBS | DIASTOLIC BLOOD PRESSURE: 72 MMHG

## 2021-04-29 DIAGNOSIS — O09.893 SUPERVISION OF OTHER HIGH RISK PREGNANCIES, THIRD TRIMESTER: Primary | ICD-10-CM

## 2021-04-29 DIAGNOSIS — O09.893 SUPERVISION OF OTHER HIGH RISK PREGNANCIES, THIRD TRIMESTER: ICD-10-CM

## 2021-04-29 PROCEDURE — 87480 CANDIDA DNA DIR PROBE: CPT

## 2021-04-29 PROCEDURE — 87510 GARDNER VAG DNA DIR PROBE: CPT

## 2021-04-29 PROCEDURE — 87660 TRICHOMONAS VAGIN DIR PROBE: CPT

## 2021-04-29 PROCEDURE — 90040 PR PRENATAL FOLLOW UP: CPT | Performed by: NURSE PRACTITIONER

## 2021-04-29 ASSESSMENT — FIBROSIS 4 INDEX: FIB4 SCORE: 0.35

## 2021-04-29 NOTE — PATIENT INSTRUCTIONS
P:  1.  GBS @ 36 wks.          2.  Continue FKCs.          3.  Questions answered.          4.  L&D policies reviewed w pt.        5.  Encourage adequate water intake.        6.  F/u 2 wks.        7.  D/w pt safe sleep.

## 2021-04-29 NOTE — PROGRESS NOTES
OB follow up   + fetal movement.  No VB, LOF or UC's.  Phone #  [phone]  Preferred pharmacy confirmed.

## 2021-04-29 NOTE — PROGRESS NOTES
S:  Pt is  at 31w5d for routine OB follow up.  Reports some questionable LOF for the past couple days.  Has noticed feeling wet, or like her period had come but there wasn't any blood.  Reports good FM.  Denies VB, RUCs or vaginal DC.      O:    Vitals:    21 1530   BP: 120/72   Weight: 78.9 kg (174 lb)           FHTs: 152        Fundal ht: 31 cm.        SSE: neg ferning, neg pooling, equivocal nitrazine    A:  IUP at 31w5d  Patient Active Problem List    Diagnosis Date Noted   • Supervision of other high risk pregnancies, third trimester 2021   • Abnormal GTT (glucose tolerance test)- passed 3hr GTT 2021   • Rubella non-immune status, antepartum 2021   • Hx of preeclampsia, prior pregnancy, currently pregnant, second trimester 2021   • Hx of postpartum hemorrhage, currently pregnant, second trimester 2021   • Hx of transfusion of packed red blood cells 2021        P:  1.  GBS @ 36 wks.          2.  Continue FKCs.          3.  Questions answered.          4.  L&D policies reviewed w pt.        5.  Encourage adequate water intake.        6.  F/u 2 wks.        7.  D/w pt safe sleep.        8.  Vaginal pathogen swab done.    Chaperone offered and provided by Varsha Ponce MA.

## 2021-04-30 ENCOUNTER — TELEPHONE (OUTPATIENT)
Dept: OBGYN | Facility: CLINIC | Age: 26
End: 2021-04-30

## 2021-04-30 DIAGNOSIS — B96.89 BV (BACTERIAL VAGINOSIS): ICD-10-CM

## 2021-04-30 DIAGNOSIS — N76.0 BV (BACTERIAL VAGINOSIS): ICD-10-CM

## 2021-04-30 LAB
CANDIDA DNA VAG QL PROBE+SIG AMP: NEGATIVE
G VAGINALIS DNA VAG QL PROBE+SIG AMP: POSITIVE
T VAGINALIS DNA VAG QL PROBE+SIG AMP: NEGATIVE

## 2021-04-30 RX ORDER — METRONIDAZOLE 500 MG/1
500 TABLET ORAL 2 TIMES DAILY
Qty: 14 TABLET | Refills: 0 | Status: SHIPPED | OUTPATIENT
Start: 2021-04-30 | End: 2021-05-07

## 2021-04-30 NOTE — TELEPHONE ENCOUNTER
----- Message from Cynthia Abreu C.N.M. sent at 4/30/2021  2:03 PM PDT -----  Please call patient with results, will send antibiotics to pharmacy to treat. Call if not getting better      Called pt and informed her test came back positive for BV. Pt informed she needs to start antibiotics. Should take the full Tx and advised to take meds with food and to avoid alcohol while on Tx. Pharmacy verified with pt. Pt agreed and verbalized understanding.

## 2021-05-13 ENCOUNTER — ROUTINE PRENATAL (OUTPATIENT)
Dept: OBGYN | Facility: CLINIC | Age: 26
End: 2021-05-13
Payer: MEDICAID

## 2021-05-13 VITALS — BODY MASS INDEX: 31.07 KG/M2 | SYSTOLIC BLOOD PRESSURE: 120 MMHG | WEIGHT: 181 LBS | DIASTOLIC BLOOD PRESSURE: 78 MMHG

## 2021-05-13 DIAGNOSIS — O09.893 SUPERVISION OF OTHER HIGH RISK PREGNANCIES, THIRD TRIMESTER: Primary | ICD-10-CM

## 2021-05-13 DIAGNOSIS — Z28.39 RUBELLA NON-IMMUNE STATUS, ANTEPARTUM: ICD-10-CM

## 2021-05-13 DIAGNOSIS — O09.899 RUBELLA NON-IMMUNE STATUS, ANTEPARTUM: ICD-10-CM

## 2021-05-13 PROCEDURE — 90040 PR PRENATAL FOLLOW UP: CPT | Performed by: NURSE PRACTITIONER

## 2021-05-13 RX ORDER — METRONIDAZOLE 500 MG/1
500 TABLET ORAL EVERY 12 HOURS
Qty: 14 TABLET | Refills: 0 | Status: SHIPPED | OUTPATIENT
Start: 2021-05-13 | End: 2021-05-20

## 2021-05-13 ASSESSMENT — FIBROSIS 4 INDEX: FIB4 SCORE: 0.35

## 2021-05-13 NOTE — PROGRESS NOTES
Pt here today for OB follow up  Pt states no VB or LOF   Reports +FM  Good # [phone]   Pharmacy Confirmed.

## 2021-05-27 ENCOUNTER — ROUTINE PRENATAL (OUTPATIENT)
Dept: OBGYN | Facility: CLINIC | Age: 26
End: 2021-05-27
Payer: MEDICAID

## 2021-05-27 VITALS — SYSTOLIC BLOOD PRESSURE: 118 MMHG | WEIGHT: 183 LBS | DIASTOLIC BLOOD PRESSURE: 82 MMHG | BODY MASS INDEX: 31.41 KG/M2

## 2021-05-27 DIAGNOSIS — O09.893 SUPERVISION OF OTHER HIGH RISK PREGNANCIES, THIRD TRIMESTER: Primary | ICD-10-CM

## 2021-05-27 PROCEDURE — 90040 PR PRENATAL FOLLOW UP: CPT | Performed by: NURSE PRACTITIONER

## 2021-05-27 ASSESSMENT — FIBROSIS 4 INDEX: FIB4 SCORE: 0.35

## 2021-05-27 NOTE — PROGRESS NOTES
OB follow up   + fetal movement.  No VB, LOF or UC's.  Pt states she is having cramping in her lower abdomen and shooting pains in rectum  [phone] (home)   Preferred pharmacy confirmed.

## 2021-06-04 ENCOUNTER — ROUTINE PRENATAL (OUTPATIENT)
Dept: OBGYN | Facility: CLINIC | Age: 26
End: 2021-06-04
Payer: MEDICAID

## 2021-06-04 ENCOUNTER — HOSPITAL ENCOUNTER (OUTPATIENT)
Facility: MEDICAL CENTER | Age: 26
End: 2021-06-04
Attending: NURSE PRACTITIONER
Payer: MEDICAID

## 2021-06-04 VITALS — DIASTOLIC BLOOD PRESSURE: 76 MMHG | SYSTOLIC BLOOD PRESSURE: 120 MMHG | WEIGHT: 186 LBS | BODY MASS INDEX: 31.93 KG/M2

## 2021-06-04 DIAGNOSIS — O09.893 SUPERVISION OF OTHER HIGH RISK PREGNANCIES, THIRD TRIMESTER: Primary | ICD-10-CM

## 2021-06-04 DIAGNOSIS — O09.893 SUPERVISION OF OTHER HIGH RISK PREGNANCIES, THIRD TRIMESTER: ICD-10-CM

## 2021-06-04 PROCEDURE — 87150 DNA/RNA AMPLIFIED PROBE: CPT

## 2021-06-04 PROCEDURE — 87081 CULTURE SCREEN ONLY: CPT

## 2021-06-04 PROCEDURE — 90040 PR PRENATAL FOLLOW UP: CPT | Performed by: NURSE PRACTITIONER

## 2021-06-04 ASSESSMENT — FIBROSIS 4 INDEX: FIB4 SCORE: 0.35

## 2021-06-04 NOTE — PROGRESS NOTES
Pt here today for OB follow up  GBS to be done today  Reports +FM  WT: 186 lb  BP: 120/76  Preferred pharmacy verified with pt.  Pt states no complaints or concerns today  Good # [phone]

## 2021-06-04 NOTE — PROGRESS NOTES
No concerns with pre-e s/s.  Reviewed s/s again and aware to go to hospital if occurs.  GBS collected.

## 2021-06-06 LAB — GP B STREP DNA SPEC QL NAA+PROBE: NEGATIVE

## 2021-06-15 ENCOUNTER — ROUTINE PRENATAL (OUTPATIENT)
Dept: OBGYN | Facility: CLINIC | Age: 26
End: 2021-06-15
Payer: MEDICAID

## 2021-06-15 VITALS — SYSTOLIC BLOOD PRESSURE: 118 MMHG | DIASTOLIC BLOOD PRESSURE: 76 MMHG | BODY MASS INDEX: 32.27 KG/M2 | WEIGHT: 188 LBS

## 2021-06-15 DIAGNOSIS — O09.893 SUPERVISION OF OTHER HIGH RISK PREGNANCIES, THIRD TRIMESTER: Primary | ICD-10-CM

## 2021-06-15 PROCEDURE — 90040 PR PRENATAL FOLLOW UP: CPT | Performed by: NURSE PRACTITIONER

## 2021-06-15 ASSESSMENT — FIBROSIS 4 INDEX: FIB4 SCORE: 0.35

## 2021-06-15 NOTE — NON-PROVIDER
Pt is 38.3 today, c/o irregular ctx, denies vaginal bleeding, LOF, has +FM. Agreed to cervical exam closed/50/-1. Informed pt we will schedule IOL. Provided education on s/s of labor and how to monitor labor ctx. Call or go to L&D if decreased or no FM, vaginal bleeding, or LOF. Pt had no other concerns.  RTC in 1 week.

## 2021-06-15 NOTE — PROGRESS NOTES
OB follow up   + FM, Denies ANDRES EMMANUEL  Phone # [phone]  Preferred pharmacy confirmed  Pt c/o irregular UC  GBS negative

## 2021-06-22 ENCOUNTER — ROUTINE PRENATAL (OUTPATIENT)
Dept: OBGYN | Facility: CLINIC | Age: 26
End: 2021-06-22
Payer: MEDICAID

## 2021-06-22 VITALS — SYSTOLIC BLOOD PRESSURE: 112 MMHG | WEIGHT: 191 LBS | DIASTOLIC BLOOD PRESSURE: 70 MMHG | BODY MASS INDEX: 32.79 KG/M2

## 2021-06-22 DIAGNOSIS — Z34.80 SUPERVISION OF OTHER NORMAL PREGNANCY, ANTEPARTUM: ICD-10-CM

## 2021-06-22 PROCEDURE — 90040 PR PRENATAL FOLLOW UP: CPT | Performed by: NURSE PRACTITIONER

## 2021-06-22 ASSESSMENT — FIBROSIS 4 INDEX: FIB4 SCORE: 0.35

## 2021-06-22 NOTE — PROGRESS NOTES
OB follow up   + fetal movement.  No VB, LOF or UC's.  [phone] (home)   IOL and GEL  information and instructions given  GBS Negative   Preferred pharmacy confirmed.

## 2021-06-22 NOTE — PROGRESS NOTES
SUBJECTIVE:  Pt is a 25 y.o.   at 39w3d  gestation. Presents today for follow-up prenatal care. Reports no issues at this time.  Reports good fetal movement. Denies regular cramping/contractions, bleeding or leaking of fluid. Denies dysuria, headaches, N/V. Generally feels well today.     OBJECTIVE:  - See prenatal vitals flow  -   Vitals:    21 1603   BP: 112/70   Weight: 86.6 kg (191 lb)                 ASSESSMENT:   - IUP at 39w3d    - S=D   -   Patient Active Problem List    Diagnosis Date Noted   • Supervision of other normal pregnancy, antepartum 2021   • Rubella non-immune status, antepartum 2021   • Hx of preeclampsia, prior pregnancy, currently pregnant, second trimester 2021   • Hx of postpartum hemorrhage, currently pregnant, second trimester 2021   • Hx of transfusion of packed red blood cells 2021         PLAN:  - S/sx pregnancy and labor warning signs vs general discomforts discussed  - Fetal movements and/or kick counts reviewed   - Adequate hydration reinforced  - Nutrition/exercise/vitamin education; continue PNV  - Plans unsure for contraception Pp: FOB is planning a vasectomy   - S/p Tdap vacc   - IOL next week   - Gel cancelled, inpatient IOL in am only due to being favorable   - Anticipatory guidance given  - RTC PRN

## 2021-06-24 ENCOUNTER — HOSPITAL ENCOUNTER (INPATIENT)
Facility: MEDICAL CENTER | Age: 26
LOS: 2 days | End: 2021-06-26
Attending: OBSTETRICS & GYNECOLOGY | Admitting: OBSTETRICS & GYNECOLOGY
Payer: MEDICAID

## 2021-06-24 ENCOUNTER — ANESTHESIA (OUTPATIENT)
Dept: ANESTHESIOLOGY | Facility: MEDICAL CENTER | Age: 26
End: 2021-06-24
Payer: MEDICAID

## 2021-06-24 ENCOUNTER — ANESTHESIA EVENT (OUTPATIENT)
Dept: ANESTHESIOLOGY | Facility: MEDICAL CENTER | Age: 26
End: 2021-06-24
Payer: MEDICAID

## 2021-06-24 LAB
ALBUMIN SERPL BCP-MCNC: 3.6 G/DL (ref 3.2–4.9)
ALBUMIN/GLOB SERPL: 1 G/DL
ALP SERPL-CCNC: 156 U/L (ref 30–99)
ALT SERPL-CCNC: 12 U/L (ref 2–50)
ANION GAP SERPL CALC-SCNC: 13 MMOL/L (ref 7–16)
AST SERPL-CCNC: 22 U/L (ref 12–45)
BASOPHILS # BLD AUTO: 0.2 % (ref 0–1.8)
BASOPHILS # BLD: 0.02 K/UL (ref 0–0.12)
BILIRUB SERPL-MCNC: 0.3 MG/DL (ref 0.1–1.5)
BUN SERPL-MCNC: 8 MG/DL (ref 8–22)
CALCIUM SERPL-MCNC: 9.1 MG/DL (ref 8.5–10.5)
CHLORIDE SERPL-SCNC: 106 MMOL/L (ref 96–112)
CO2 SERPL-SCNC: 18 MMOL/L (ref 20–33)
CREAT SERPL-MCNC: 0.6 MG/DL (ref 0.5–1.4)
CREAT UR-MCNC: 72.19 MG/DL
CRYSTALS AMN MICRO: NORMAL
EOSINOPHIL # BLD AUTO: 0.02 K/UL (ref 0–0.51)
EOSINOPHIL NFR BLD: 0.2 % (ref 0–6.9)
ERYTHROCYTE [DISTWIDTH] IN BLOOD BY AUTOMATED COUNT: 45.5 FL (ref 35.9–50)
GLOBULIN SER CALC-MCNC: 3.7 G/DL (ref 1.9–3.5)
GLUCOSE SERPL-MCNC: 70 MG/DL (ref 65–99)
HCT VFR BLD AUTO: 43 % (ref 37–47)
HGB BLD-MCNC: 14.4 G/DL (ref 12–16)
HOLDING TUBE BB 8507: NORMAL
IMM GRANULOCYTES # BLD AUTO: 0.08 K/UL (ref 0–0.11)
IMM GRANULOCYTES NFR BLD AUTO: 0.9 % (ref 0–0.9)
LYMPHOCYTES # BLD AUTO: 2 K/UL (ref 1–4.8)
LYMPHOCYTES NFR BLD: 23.1 % (ref 22–41)
MCH RBC QN AUTO: 28.2 PG (ref 27–33)
MCHC RBC AUTO-ENTMCNC: 33.5 G/DL (ref 33.6–35)
MCV RBC AUTO: 84.3 FL (ref 81.4–97.8)
MONOCYTES # BLD AUTO: 0.5 K/UL (ref 0–0.85)
MONOCYTES NFR BLD AUTO: 5.8 % (ref 0–13.4)
NEUTROPHILS # BLD AUTO: 6.04 K/UL (ref 2–7.15)
NEUTROPHILS NFR BLD: 69.8 % (ref 44–72)
NRBC # BLD AUTO: 0 K/UL
NRBC BLD-RTO: 0 /100 WBC
PLATELET # BLD AUTO: 201 K/UL (ref 164–446)
PMV BLD AUTO: 11.9 FL (ref 9–12.9)
POTASSIUM SERPL-SCNC: 4 MMOL/L (ref 3.6–5.5)
PROT SERPL-MCNC: 7.3 G/DL (ref 6–8.2)
PROT UR-MCNC: 11 MG/DL (ref 0–15)
PROT/CREAT UR: 152 MG/G (ref 10–107)
RBC # BLD AUTO: 5.1 M/UL (ref 4.2–5.4)
SARS-COV+SARS-COV-2 AG RESP QL IA.RAPID: NOTDETECTED
SODIUM SERPL-SCNC: 137 MMOL/L (ref 135–145)
SPECIMEN SOURCE: NORMAL
WBC # BLD AUTO: 8.7 K/UL (ref 4.8–10.8)

## 2021-06-24 PROCEDURE — 303615 HCHG EPIDURAL/SPINAL ANESTHESIA FOR LABOR

## 2021-06-24 PROCEDURE — 89060 EXAM SYNOVIAL FLUID CRYSTALS: CPT

## 2021-06-24 PROCEDURE — 85025 COMPLETE CBC W/AUTO DIFF WBC: CPT

## 2021-06-24 PROCEDURE — 770002 HCHG ROOM/CARE - OB PRIVATE (112)

## 2021-06-24 PROCEDURE — 36415 COLL VENOUS BLD VENIPUNCTURE: CPT

## 2021-06-24 PROCEDURE — 700111 HCHG RX REV CODE 636 W/ 250 OVERRIDE (IP)

## 2021-06-24 PROCEDURE — 302449 STATCHG TRIAGE ONLY (STATISTIC)

## 2021-06-24 PROCEDURE — 10907ZC DRAINAGE OF AMNIOTIC FLUID, THERAPEUTIC FROM PRODUCTS OF CONCEPTION, VIA NATURAL OR ARTIFICIAL OPENING: ICD-10-PCS | Performed by: NURSE PRACTITIONER

## 2021-06-24 PROCEDURE — 82570 ASSAY OF URINE CREATININE: CPT

## 2021-06-24 PROCEDURE — 87426 SARSCOV CORONAVIRUS AG IA: CPT

## 2021-06-24 PROCEDURE — 700111 HCHG RX REV CODE 636 W/ 250 OVERRIDE (IP): Performed by: ANESTHESIOLOGY

## 2021-06-24 PROCEDURE — 700111 HCHG RX REV CODE 636 W/ 250 OVERRIDE (IP): Performed by: STUDENT IN AN ORGANIZED HEALTH CARE EDUCATION/TRAINING PROGRAM

## 2021-06-24 PROCEDURE — U0003 INFECTIOUS AGENT DETECTION BY NUCLEIC ACID (DNA OR RNA); SEVERE ACUTE RESPIRATORY SYNDROME CORONAVIRUS 2 (SARS-COV-2) (CORONAVIRUS DISEASE [COVID-19]), AMPLIFIED PROBE TECHNIQUE, MAKING USE OF HIGH THROUGHPUT TECHNOLOGIES AS DESCRIBED BY CMS-2020-01-R: HCPCS

## 2021-06-24 PROCEDURE — 80053 COMPREHEN METABOLIC PANEL: CPT

## 2021-06-24 PROCEDURE — U0005 INFEC AGEN DETEC AMPLI PROBE: HCPCS

## 2021-06-24 PROCEDURE — 84156 ASSAY OF PROTEIN URINE: CPT

## 2021-06-24 PROCEDURE — 700105 HCHG RX REV CODE 258: Performed by: NURSE PRACTITIONER

## 2021-06-24 RX ORDER — SODIUM CHLORIDE, SODIUM LACTATE, POTASSIUM CHLORIDE, AND CALCIUM CHLORIDE .6; .31; .03; .02 G/100ML; G/100ML; G/100ML; G/100ML
1000 INJECTION, SOLUTION INTRAVENOUS
Status: DISCONTINUED | OUTPATIENT
Start: 2021-06-24 | End: 2021-06-25 | Stop reason: HOSPADM

## 2021-06-24 RX ORDER — MISOPROSTOL 200 UG/1
800 TABLET ORAL
Status: DISCONTINUED | OUTPATIENT
Start: 2021-06-24 | End: 2021-06-25 | Stop reason: HOSPADM

## 2021-06-24 RX ORDER — ROPIVACAINE HYDROCHLORIDE 2 MG/ML
INJECTION, SOLUTION EPIDURAL; INFILTRATION; PERINEURAL CONTINUOUS
Status: DISCONTINUED | OUTPATIENT
Start: 2021-06-24 | End: 2021-06-25 | Stop reason: HOSPADM

## 2021-06-24 RX ORDER — ONDANSETRON 2 MG/ML
4 INJECTION INTRAMUSCULAR; INTRAVENOUS EVERY 6 HOURS PRN
Status: DISCONTINUED | OUTPATIENT
Start: 2021-06-24 | End: 2021-06-26 | Stop reason: HOSPADM

## 2021-06-24 RX ORDER — SODIUM CHLORIDE, SODIUM LACTATE, POTASSIUM CHLORIDE, AND CALCIUM CHLORIDE .6; .31; .03; .02 G/100ML; G/100ML; G/100ML; G/100ML
250 INJECTION, SOLUTION INTRAVENOUS PRN
Status: DISCONTINUED | OUTPATIENT
Start: 2021-06-24 | End: 2021-06-25 | Stop reason: HOSPADM

## 2021-06-24 RX ORDER — DEXTROSE, SODIUM CHLORIDE, SODIUM LACTATE, POTASSIUM CHLORIDE, AND CALCIUM CHLORIDE 5; .6; .31; .03; .02 G/100ML; G/100ML; G/100ML; G/100ML; G/100ML
INJECTION, SOLUTION INTRAVENOUS CONTINUOUS
Status: DISCONTINUED | OUTPATIENT
Start: 2021-06-24 | End: 2021-06-25 | Stop reason: HOSPADM

## 2021-06-24 RX ORDER — METHYLERGONOVINE MALEATE 0.2 MG/ML
0.2 INJECTION INTRAVENOUS
Status: COMPLETED | OUTPATIENT
Start: 2021-06-24 | End: 2021-06-25

## 2021-06-24 RX ORDER — BUPIVACAINE HYDROCHLORIDE 2.5 MG/ML
INJECTION, SOLUTION EPIDURAL; INFILTRATION; INTRACAUDAL
Status: COMPLETED
Start: 2021-06-24 | End: 2021-06-24

## 2021-06-24 RX ORDER — BUPIVACAINE HYDROCHLORIDE 2.5 MG/ML
INJECTION, SOLUTION EPIDURAL; INFILTRATION; INTRACAUDAL PRN
Status: DISCONTINUED | OUTPATIENT
Start: 2021-06-24 | End: 2021-06-25 | Stop reason: SURG

## 2021-06-24 RX ORDER — ALUMINA, MAGNESIA, AND SIMETHICONE 2400; 2400; 240 MG/30ML; MG/30ML; MG/30ML
30 SUSPENSION ORAL EVERY 6 HOURS PRN
Status: DISCONTINUED | OUTPATIENT
Start: 2021-06-24 | End: 2021-06-25 | Stop reason: HOSPADM

## 2021-06-24 RX ORDER — ONDANSETRON 4 MG/1
4 TABLET, ORALLY DISINTEGRATING ORAL EVERY 6 HOURS PRN
Status: DISCONTINUED | OUTPATIENT
Start: 2021-06-24 | End: 2021-06-26 | Stop reason: HOSPADM

## 2021-06-24 RX ORDER — ROPIVACAINE HYDROCHLORIDE 2 MG/ML
INJECTION, SOLUTION EPIDURAL; INFILTRATION; PERINEURAL
Status: COMPLETED
Start: 2021-06-24 | End: 2021-06-24

## 2021-06-24 RX ORDER — HYDROXYZINE 50 MG/1
50 TABLET, FILM COATED ORAL EVERY 6 HOURS PRN
Status: DISCONTINUED | OUTPATIENT
Start: 2021-06-24 | End: 2021-06-25 | Stop reason: HOSPADM

## 2021-06-24 RX ADMIN — ROPIVACAINE HYDROCHLORIDE: 2 INJECTION, SOLUTION EPIDURAL; INFILTRATION; PERINEURAL at 21:20

## 2021-06-24 RX ADMIN — FENTANYL CITRATE 50 MCG: 50 INJECTION, SOLUTION INTRAMUSCULAR; INTRAVENOUS at 21:16

## 2021-06-24 RX ADMIN — OXYTOCIN 2 MILLI-UNITS/MIN: 10 INJECTION, SOLUTION INTRAMUSCULAR; INTRAVENOUS at 14:45

## 2021-06-24 RX ADMIN — BUPIVACAINE HYDROCHLORIDE 2.5 ML: 2.5 INJECTION, SOLUTION EPIDURAL; INFILTRATION; INTRACAUDAL; PERINEURAL at 21:21

## 2021-06-24 RX ADMIN — SODIUM CHLORIDE, SODIUM LACTATE, POTASSIUM CHLORIDE, CALCIUM CHLORIDE AND DEXTROSE MONOHYDRATE: 5; 600; 310; 30; 20 INJECTION, SOLUTION INTRAVENOUS at 22:30

## 2021-06-24 RX ADMIN — ROPIVACAINE HYDROCHLORIDE: 2 INJECTION, SOLUTION EPIDURAL; INFILTRATION at 21:20

## 2021-06-24 RX ADMIN — FENTANYL CITRATE 50 MCG: 50 INJECTION, SOLUTION INTRAMUSCULAR; INTRAVENOUS at 21:21

## 2021-06-24 RX ADMIN — BUPIVACAINE HYDROCHLORIDE 2.5 ML: 2.5 INJECTION, SOLUTION EPIDURAL; INFILTRATION; INTRACAUDAL; PERINEURAL at 21:16

## 2021-06-24 ASSESSMENT — PATIENT HEALTH QUESTIONNAIRE - PHQ9
SUM OF ALL RESPONSES TO PHQ9 QUESTIONS 1 AND 2: 0
2. FEELING DOWN, DEPRESSED, IRRITABLE, OR HOPELESS: NOT AT ALL
1. LITTLE INTEREST OR PLEASURE IN DOING THINGS: NOT AT ALL

## 2021-06-24 ASSESSMENT — LIFESTYLE VARIABLES
ALCOHOL_USE: NO
EVER_SMOKED: NEVER

## 2021-06-24 ASSESSMENT — FIBROSIS 4 INDEX: FIB4 SCORE: 0.35

## 2021-06-24 NOTE — H&P
"OB H&P:    CC: LOF    HPI:  Ms. Kadi Soliman is a 25 y.o.  @ 39w5d by 12 week ultrasound with leakage of fluid and positive ferning.     Patient has history of pre-eclampsia with prior pregnancy and had elevated diastolic blood pressure of 92 while in triage and PIH labs were drawn.    History of PPH with previous delivery    Contractions: Yes   Loss of fluid: Yes  , @ 0630 this morning  Vaginal bleeding: No   Fetal movement: present      PNC with [facility]    PNL:  Rh+, Rubella non-immune, HIV neg, TrepAb neg, HBsAg NR, GC/CT neg/neg  Glucola: WNL  GBS -      ROS:  Const: denies fevers, general concerns  CV/resp: reports no concerns  GI: denies abd pain, GI concerns  : see HPI  Neuro: denies HA/vision changes    GYN: denies STIs, no cervical procedures    History reviewed. No pertinent past medical history.    History reviewed. No pertinent surgical history.    No current facility-administered medications on file prior to encounter.     Current Outpatient Medications on File Prior to Encounter   Medication Sig Dispense Refill   • aspirin (ASA) 81 MG Chew Tab chewable tablet Chew 81 mg every day.     • PRENATAL VIT W/ FE BISG-FA PO Take  by mouth.         Family History   Problem Relation Age of Onset   • No Known Problems Mother    • No Known Problems Father    • No Known Problems Brother    • Heart Disease Maternal Grandmother        Social History     Tobacco Use   • Smoking status: Never Smoker   • Smokeless tobacco: Never Used   Vaping Use   • Vaping Use: Never used   Substance Use Topics   • Alcohol use: Not Currently   • Drug use: No         PE:  Vitals:    21 1050 21 1052 21 1105 21 1120   BP: 139/92  139/89 130/85   Pulse: 83  88 78   Resp: 18      Temp: 36.6 °C (97.8 °F)      TempSrc: Temporal      SpO2: 96%      Weight:  86.6 kg (191 lb)     Height:  1.651 m (5' 5\")       gen: AAO, NAD  abd: soft, gravid, NT, EFW 3100g  Ext: NT, 1+ edema    SVE: 3/60/-2 @ 1200  FHT: " 140/mod variability/+ accels/ - decels  Rohit: q10 min ctx    Positive fern test    A/P: 25 y.o.  @ 39w5d by 12 week ultrasound with SROM at 0630 this morning.   - admit to L&D  - GBS negative  - pitocin augmentation  - patient unsure if wants epidural  - history of pre-E, elevated diastolic blood pressure in triage to 92, we will draw CBC, CMP, urine protein creatinine ratio  - continuous fetal monitoring    I saw and examined the patient and discussed the management with the resident. I reviewed the resident's note and agree with the documented findings and plan of care.    Additional attending comments:  Positive ferning with elevated diastolic blood pressure.  Hx of preE.  Low suspicion patient has preE currently, but will plan for admission and delivery before it develops.  Oxytocin for augmentation, GBS neg    Katerine Soto D.O.

## 2021-06-24 NOTE — ED PROVIDER NOTES
LABOR AND DELIVERY TRIAGE NOTE    PATIENT ID:  NAME:  Kadi Soliman  MRN:               [id number]  YOB: 1995    CC:  Loss of fluid    HPI:  Kadi Soliman is a 25 y.o. female  at 39w5d by a 12 week ultrasound. Patient's last menstrual period was 2020 (exact date).. Estimated Date of Delivery: 21  Patient presents complaining of loss of fluid around 0630 this morning.  Fluid was clear and noticed with urination, there was more than normal.  Positive fetal movement.  No vaginal bleeding.  Pregnancy was uncomplicated, but she did have pre-eclampsia with her previous pregnancy.     ROS: Patient denies any fever chills, nausea, vomiting, headache, chest pain, shortness of breath, or dysuria or unusual swelling of hands or feet.     Prenatal Care: Obtained at [facility]    Prenatal Labs:   HepBsAg: neg HIV: neg Rubella: non-immune   RPR: NR PAP GBS: neg   GC/CT: neg O/ Ab + Quad Screen   No results for input(s): WBC, RBC, HEMOGLOBIN, HEMATOCRIT, MCV, MCH, RDW, PLATELETCT, MPV, NEUTSPOLYS, LYMPHOCYTES, MONOCYTES, EOSINOPHILS, BASOPHILS, RBCMORPHOLO in the last 72 hours.  No results for input(s): SODIUM, POTASSIUM, CHLORIDE, CO2, GLUCOSE, BUN, CPKTOTAL in the last 72 hours.       IMAGIN/1 OB ultrasound:   Transverse,   Posterior grade 1 placenta,   INEZ 16.64 cm,   EFW 31%ile    POB Hx:    PMH/Problem List:    History reviewed. No pertinent past medical history.  Patient Active Problem List    Diagnosis Date Noted   • Supervision of other normal pregnancy, antepartum 2021   • Rubella non-immune status, antepartum 2021   • Hx of preeclampsia, prior pregnancy, currently pregnant, second trimester 2021   • Hx of postpartum hemorrhage, currently pregnant, second trimester 2021   • Hx of transfusion of packed red blood cells 2021       Current Outpatient Medications:  No current facility-administered medications on file prior to encounter.     Current Outpatient  Medications on File Prior to Encounter   Medication Sig Dispense Refill   • aspirin (ASA) 81 MG Chew Tab chewable tablet Chew 81 mg every day.     • PRENATAL VIT W/ FE BISG-FA PO Take  by mouth.         PSH:    History reviewed. No pertinent surgical history.    Allergies:   No Known Allergies    SH:  Social History     Socioeconomic History   • Marital status:      Spouse name: Not on file   • Number of children: Not on file   • Years of education: Not on file   • Highest education level: Not on file   Occupational History   • Not on file   Tobacco Use   • Smoking status: Never Smoker   • Smokeless tobacco: Never Used   Vaping Use   • Vaping Use: Never used   Substance and Sexual Activity   • Alcohol use: Not Currently   • Drug use: No   • Sexual activity: Yes     Partners: Male     Comment: none. unplanned pregnancy   Other Topics Concern   • Not on file   Social History Narrative   • Not on file     Social Determinants of Health     Financial Resource Strain:    • Difficulty of Paying Living Expenses:    Food Insecurity:    • Worried About Running Out of Food in the Last Year:    • Ran Out of Food in the Last Year:    Transportation Needs:    • Lack of Transportation (Medical):    • Lack of Transportation (Non-Medical):    Physical Activity:    • Days of Exercise per Week:    • Minutes of Exercise per Session:    Stress:    • Feeling of Stress :    Social Connections:    • Frequency of Communication with Friends and Family:    • Frequency of Social Gatherings with Friends and Family:    • Attends Voodoo Services:    • Active Member of Clubs or Organizations:    • Attends Club or Organization Meetings:    • Marital Status:    Intimate Partner Violence:    • Fear of Current or Ex-Partner:    • Emotionally Abused:    • Physically Abused:    • Sexually Abused:          PHYSICAL EXAM:  Vitals:    06/24/21 1050 06/24/21 1052 06/24/21 1105 06/24/21 1120   BP: 139/92  139/89 130/85   Pulse: 83  88 78   Resp: 18  "     Temp: 36.6 °C (97.8 °F)      TempSrc: Temporal      SpO2: 96%      Weight:  86.6 kg (191 lb)     Height:  1.651 m (5' 5\")       Temp (24hrs), Av.6 °C (97.8 °F), Min:36.6 °C (97.8 °F), Max:36.6 °C (97.8 °F)    General: No acute distress, resting comfortably in bed.  HEENT: normocephalic, nontraumatic, PERRLA, EOMI  Cardiovascular: Heart RRR with no murmurs, rubs or gallops. Distal Pulses 2+  Respiratory: symmetric chest expansion, lungs CTA bilaterally with no wheezes rales or  rhonci  Abdomen: gravid, nontender  Musculoskeletal: strength 5/5 in four extremities, 1+ edema in bilateral lower extremities  Neuro: non focal with no numbness, tingling or changes in sensation    SVE: 3/60%/-2 @ 1200  [place]: Q10 minutes; EFM: 140 with accels present    A/P:  Kadi Soliman is a 25 y.o. female  at 39w5d with loss of fluid    # hx pre-eclampsia  - blood pressure noted to be 92 diastolic  - we will draw [facility] labs    # loss of fluid  - ferning positive  - admit to L&D, see H&P    Rodrigo Amado D.O.      "

## 2021-06-24 NOTE — PROGRESS NOTES
"1245- Report received from DARVIN Wylie. Pt moved to room S222 for labor admission.  1300- Urine collected for Prot/Cr ratio, per Dr. Amado.  1315- Covid swabs collected.  1335- IV placed, labs collected.  1350- Dr. Amado at the bedside, BS US performed, vtx presentation confirmed.  Admission profile complete.  1445- SVE 2-3/50/-2, no fluid present, MARY Hughes RN. Pt states, \"I haven't felt any leaking for a while.\"  1455- pt up on birth ball at the bedside.  1605- Dr. Amado at the bedside, POC discussed.  1900- Report given to DARVIN Mosqueda.  "

## 2021-06-24 NOTE — PROGRESS NOTES
Labor Progress Note:      S:  Pt reports feeling comfortable, she is sitting on peanut ball. She denied recent leaking of fluid.     Patient Vitals for the past 4 hrs:   BP Temp Temp src Pulse Resp   21 1428 120/65 -- -- 88 --   21 1340 123/75 36.7 °C (98.1 °F) Temporal 73 18     Gen: AAO, NAD    SVE: 2-3/50/-2 @ 1445    FHT: 140/mod variability/+accels/- decels  toco: q3-4 min ctx      A/P: 25 y.o.  @ 39w5d by 12 week US admitted for SROM  - labor: pitocin @ 6mU/min  - FHT: cat 1  - GBS: neg  - pain: controlled

## 2021-06-25 LAB
ERYTHROCYTE [DISTWIDTH] IN BLOOD BY AUTOMATED COUNT: 45 FL (ref 35.9–50)
HCT VFR BLD AUTO: 37.8 % (ref 37–47)
HGB BLD-MCNC: 12.6 G/DL (ref 12–16)
MCH RBC QN AUTO: 28.2 PG (ref 27–33)
MCHC RBC AUTO-ENTMCNC: 33.3 G/DL (ref 33.6–35)
MCV RBC AUTO: 84.6 FL (ref 81.4–97.8)
PLATELET # BLD AUTO: 172 K/UL (ref 164–446)
PMV BLD AUTO: 12.6 FL (ref 9–12.9)
RBC # BLD AUTO: 4.47 M/UL (ref 4.2–5.4)
SARS-COV-2 RNA RESP QL NAA+PROBE: NOTDETECTED
SPECIMEN SOURCE: NORMAL
WBC # BLD AUTO: 14.7 K/UL (ref 4.8–10.8)

## 2021-06-25 PROCEDURE — 304965 HCHG RECOVERY SERVICES

## 2021-06-25 PROCEDURE — 36415 COLL VENOUS BLD VENIPUNCTURE: CPT

## 2021-06-25 PROCEDURE — 770002 HCHG ROOM/CARE - OB PRIVATE (112)

## 2021-06-25 PROCEDURE — A9270 NON-COVERED ITEM OR SERVICE: HCPCS | Performed by: STUDENT IN AN ORGANIZED HEALTH CARE EDUCATION/TRAINING PROGRAM

## 2021-06-25 PROCEDURE — 0HQ9XZZ REPAIR PERINEUM SKIN, EXTERNAL APPROACH: ICD-10-PCS | Performed by: OBSTETRICS & GYNECOLOGY

## 2021-06-25 PROCEDURE — 700102 HCHG RX REV CODE 250 W/ 637 OVERRIDE(OP): Performed by: FAMILY MEDICINE

## 2021-06-25 PROCEDURE — 700105 HCHG RX REV CODE 258

## 2021-06-25 PROCEDURE — 700102 HCHG RX REV CODE 250 W/ 637 OVERRIDE(OP)

## 2021-06-25 PROCEDURE — 700111 HCHG RX REV CODE 636 W/ 250 OVERRIDE (IP): Performed by: FAMILY MEDICINE

## 2021-06-25 PROCEDURE — 85027 COMPLETE CBC AUTOMATED: CPT

## 2021-06-25 PROCEDURE — 700102 HCHG RX REV CODE 250 W/ 637 OVERRIDE(OP): Performed by: STUDENT IN AN ORGANIZED HEALTH CARE EDUCATION/TRAINING PROGRAM

## 2021-06-25 PROCEDURE — 700111 HCHG RX REV CODE 636 W/ 250 OVERRIDE (IP): Performed by: STUDENT IN AN ORGANIZED HEALTH CARE EDUCATION/TRAINING PROGRAM

## 2021-06-25 PROCEDURE — 700101 HCHG RX REV CODE 250

## 2021-06-25 PROCEDURE — 59409 OBSTETRICAL CARE: CPT

## 2021-06-25 PROCEDURE — A9270 NON-COVERED ITEM OR SERVICE: HCPCS

## 2021-06-25 PROCEDURE — A9270 NON-COVERED ITEM OR SERVICE: HCPCS | Performed by: FAMILY MEDICINE

## 2021-06-25 RX ORDER — VITAMIN A ACETATE, BETA CAROTENE, ASCORBIC ACID, CHOLECALCIFEROL, .ALPHA.-TOCOPHEROL ACETATE, DL-, THIAMINE MONONITRATE, RIBOFLAVIN, NIACINAMIDE, PYRIDOXINE HYDROCHLORIDE, FOLIC ACID, CYANOCOBALAMIN, CALCIUM CARBONATE, FERROUS FUMARATE, ZINC OXIDE, CUPRIC OXIDE 3080; 12; 120; 400; 1; 1.84; 3; 20; 22; 920; 25; 200; 27; 10; 2 [IU]/1; UG/1; MG/1; [IU]/1; MG/1; MG/1; MG/1; MG/1; MG/1; [IU]/1; MG/1; MG/1; MG/1; MG/1; MG/1
1 TABLET, FILM COATED ORAL
Status: DISCONTINUED | OUTPATIENT
Start: 2021-06-25 | End: 2021-06-26 | Stop reason: HOSPADM

## 2021-06-25 RX ORDER — MISOPROSTOL 200 UG/1
TABLET ORAL
Status: COMPLETED
Start: 2021-06-25 | End: 2021-06-25

## 2021-06-25 RX ORDER — CEFAZOLIN SODIUM 2 G/100ML
2 INJECTION, SOLUTION INTRAVENOUS ONCE
Status: COMPLETED | OUTPATIENT
Start: 2021-06-25 | End: 2021-06-25

## 2021-06-25 RX ORDER — IBUPROFEN 800 MG/1
800 TABLET ORAL EVERY 8 HOURS PRN
Status: DISCONTINUED | OUTPATIENT
Start: 2021-06-25 | End: 2021-06-26 | Stop reason: HOSPADM

## 2021-06-25 RX ORDER — DOCUSATE SODIUM 100 MG/1
100 CAPSULE, LIQUID FILLED ORAL 2 TIMES DAILY PRN
Status: DISCONTINUED | OUTPATIENT
Start: 2021-06-25 | End: 2021-06-26 | Stop reason: HOSPADM

## 2021-06-25 RX ORDER — SODIUM CHLORIDE 9 MG/ML
INJECTION, SOLUTION INTRAVENOUS
Status: COMPLETED
Start: 2021-06-25 | End: 2021-06-25

## 2021-06-25 RX ORDER — BISACODYL 10 MG
10 SUPPOSITORY, RECTAL RECTAL PRN
Status: DISCONTINUED | OUTPATIENT
Start: 2021-06-25 | End: 2021-06-26 | Stop reason: HOSPADM

## 2021-06-25 RX ORDER — SODIUM CHLORIDE, SODIUM LACTATE, POTASSIUM CHLORIDE, CALCIUM CHLORIDE 600; 310; 30; 20 MG/100ML; MG/100ML; MG/100ML; MG/100ML
INJECTION, SOLUTION INTRAVENOUS PRN
Status: DISCONTINUED | OUTPATIENT
Start: 2021-06-25 | End: 2021-06-26 | Stop reason: HOSPADM

## 2021-06-25 RX ORDER — TRANEXAMIC ACID 100 MG/ML
INJECTION, SOLUTION INTRAVENOUS
Status: COMPLETED
Start: 2021-06-25 | End: 2021-06-25

## 2021-06-25 RX ORDER — ACETAMINOPHEN 500 MG
1000 TABLET ORAL EVERY 8 HOURS PRN
Status: DISCONTINUED | OUTPATIENT
Start: 2021-06-25 | End: 2021-06-26 | Stop reason: HOSPADM

## 2021-06-25 RX ADMIN — MISOPROSTOL 1000 MCG: 200 TABLET ORAL at 02:29

## 2021-06-25 RX ADMIN — Medication 2000 ML/HR: at 01:45

## 2021-06-25 RX ADMIN — ONDANSETRON 4 MG: 2 INJECTION INTRAMUSCULAR; INTRAVENOUS at 05:18

## 2021-06-25 RX ADMIN — DOCUSATE SODIUM 100 MG: 100 CAPSULE, LIQUID FILLED ORAL at 15:54

## 2021-06-25 RX ADMIN — SODIUM CHLORIDE 100 ML: 900 INJECTION INTRAVENOUS at 03:00

## 2021-06-25 RX ADMIN — PRENATAL WITH FERROUS FUM AND FOLIC ACID 1 TABLET: 3080; 920; 120; 400; 22; 1.84; 3; 20; 10; 1; 12; 200; 27; 25; 2 TABLET ORAL at 11:42

## 2021-06-25 RX ADMIN — Medication 125 ML/HR: at 03:20

## 2021-06-25 RX ADMIN — IBUPROFEN 800 MG: 800 TABLET, FILM COATED ORAL at 19:43

## 2021-06-25 RX ADMIN — CEFAZOLIN SODIUM 2 G: 2 INJECTION, SOLUTION INTRAVENOUS at 03:20

## 2021-06-25 RX ADMIN — METHYLERGONOVINE MALEATE 0.2 MG: 0.2 INJECTION, SOLUTION INTRAMUSCULAR; INTRAVENOUS at 04:18

## 2021-06-25 RX ADMIN — TRANEXAMIC ACID 1000 MG: 100 INJECTION, SOLUTION INTRAVENOUS at 03:00

## 2021-06-25 ASSESSMENT — PAIN DESCRIPTION - PAIN TYPE
TYPE: ACUTE PAIN

## 2021-06-25 ASSESSMENT — PAIN SCALES - GENERAL: PAIN_LEVEL: 1

## 2021-06-25 NOTE — PROGRESS NOTES
- Bedside report received from DARVIN Rosario. Care assumed at this time. Discussed plan of care with pt and family who verbalize understanding.   - SVE by LINDA Mckeon, 3-/+1, with bulging forebag which was ruptured by LINDA Mckeon with clear fluid. Pt immediately started feeling increased strength of ctx and was prepped for epidural.   - Anesthesia Cartinella at bedside for epidural placement. Test dose at  without issue, pt tolerated procedure well.   - Urinary catheter placed without issue using sterile technique. SVE /+1 with mispositioned cervix. Pt repositioned onto left side with peanut ball and encourage to rest.   115 - SVE 10/100+2. LINDA Mckeon notified. PT prepped for pushing and education provided.  0140 -  viable baby boy, APGARS 7/9, .  0220 - LINDA Mckeon called to bedside for bleeding.   0230 - Bimanual exam done by MD Beaver with a total of 430mls additional bleeding including large blood clots since delivery. Medications given as ordered.  0305 - Pts uterus boggy with moderate bleeding. Pt assisted to restroom, void x1, pericare done and gown changed. Fundus rechecked and firm at the umbilicus with scant bleeding.   0345 - Fundus boggy, after massage firmed up, bleeding light. LINDA Mckeon notified and methergine ordered and given.   0435 - Fundus firm at umbilicus with light bleeding.   0515 - Pt assisted into wheelchair for transfer. Pt started complaining of nausea and then vomited x2. Zofran given as ordered and pt states she is feeling better after vomiting.   0525 - Pt transferred to post-partum and bedside report given to Estela BOSTON, bands confirmed. History discussed with RN including increased bleeding risk due to history.

## 2021-06-25 NOTE — ANESTHESIA PROCEDURE NOTES
Epidural Block    Date/Time: 6/24/2021 9:10 PM  Performed by: Aaron Gutierrez M.D.  Authorized by: Aaron Gutierrez M.D.     Patient Location:  OB  Start Time:  6/24/2021 9:10 PM  End Time:  6/24/2021 9:16 PM  Reason for Block: labor analgesia    patient identified, IV checked, site marked, risks and benefits discussed, surgical consent, monitors and equipment checked, pre-op evaluation and timeout performed    Patient Position:  Sitting  Prep: ChloraPrep, patient draped and sterile technique    Monitoring:  Blood pressure, continuous pulse oximetry and heart rate  Approach:  Midline  Location:  L3-L4  Injection Technique:  LORNE saline  Skin infiltration:  Lidocaine  Strength:  1%  Dose:  3ml  Needle Type:  Tuohy  Needle Gauge:  17 G  Needle Length:  3.5 in  Loss of resistance::  5.5  Catheter Size:  19 G  Catheter at Skin Depth:  11  Test Dose Result:  Negative   Success on 1st pass  No heme/CSF  Catheter threaded easy  Negative aspiration  No evidence of complications  Patient comfortable after loading dose

## 2021-06-25 NOTE — ANESTHESIA PREPROCEDURE EVALUATION
henry pregnancy at 39+5 weeks gestation. Hx of pre-eclampsia with prior pregnancy. Elevated BPs during this pregnancy. No other significant medical history.    Relevant Problems   NEURO   (positive) Hx of postpartum hemorrhage, currently pregnant, second trimester   (positive) Hx of preeclampsia, prior pregnancy, currently pregnant, second trimester       Physical Exam    Airway   Mallampati: II  TM distance: >3 FB  Neck ROM: full       Cardiovascular - normal exam  Rhythm: regular  Rate: normal  (-) murmur     Dental - normal exam           Pulmonary - normal exam  Breath sounds clear to auscultation     Abdominal    Neurological - normal exam                 Anesthesia Plan    ASA 2       Plan - epidural   Neuraxial block will be labor analgesia                  Pertinent diagnostic labs and testing reviewed    Informed Consent:    Anesthetic plan and risks discussed with patient.

## 2021-06-25 NOTE — L&D DELIVERY NOTE
Kadi Soliman is a 25 y.o.  now Para 2 admitted for SROM and augmentation of labor.  Her labor course was uneventful.     PreDelivery Diagnosis:  1. SIUP @ 39w6d     PostDelivery Diagnosis:  1. S/p         Procedure in Detail:  Patient began pushing in the dorsal lithotomy position.  The head delivered easily over an intact perineum at 0140 in the CLAUDE position.  The left anterior shoulder followed easily with gentle downwards pressure followed by the posterior shoulder and body.  There was no nuchal cord.  Viable male infant was placed on the maternal abdomen and was stimulated and bulb suctioned.  Cord clamping was delayed until nonpulsatile then the cord was clamped x2 and cut.  Infant APGARs were 7 and 9 at 1 and 5 minutes respectively.  Placenta delivered spontaneously intact with 3 vessel cord.  The vagina and perineum were examined revealing a midline first degree perineal laceration. Laceration repaired in standard fashion with 3-0 chromic with resulting hemostasis noted.  There was also a right labial laceration which was hemostatic. The uterus was firm with IV pitocin and fundal massage.  Patient and infant left bonding in LDR.      cc  Anesthesia - epidural    Sponge and needle counts correct.  Patient tolerated procedure well.     Anticipate routine postparum care.    Kimi Beaver M.D.  2021       Attending note: patient was proctored by Elisa Mckeon CNM who agrees with the above note.

## 2021-06-25 NOTE — LACTATION NOTE
Attempted to meet with MOB.  MOB woke up suddenly as this LC entered the room after first knocking.  MOB attempting to sleep and verbalized she would prefer for this LC to come back later when she was asked.    Will attempt to see MOB later today, if possible.  If not, Lactation will follow up with MOB tomorrow.

## 2021-06-25 NOTE — ANESTHESIA POSTPROCEDURE EVALUATION
Patient: Kadi Soliman    Procedure Summary     Date: 06/24/21 Room / Location:     Anesthesia Start: 2110 Anesthesia Stop: 06/25/21 0140    Procedure: Labor Epidural Diagnosis:     Scheduled Providers:  Responsible Provider: Aaron Gutierrez M.D.    Anesthesia Type: epidural ASA Status: 2          Final Anesthesia Type: epidural  Last vitals  BP   Blood Pressure: 124/71    Temp   36.8 °C (98.2 °F)    Pulse   88   Resp   18    SpO2   98 %      Anesthesia Post Evaluation    Patient location during evaluation: floor  Patient participation: complete - patient participated  Level of consciousness: awake and alert  Pain score: 1    Airway patency: patent  Anesthetic complications: no  Cardiovascular status: hemodynamically stable  Respiratory status: acceptable  Hydration status: euvolemic    PONV: none          No complications documented.

## 2021-06-25 NOTE — PROGRESS NOTES
Pt arrived to room 321 from L&D via wheelchair transport. Fundus firm, lochia light without clots expressed, pt able to void in L&D prior to arrival, LR with pitocin infusing at 125ml/hr. Pt and SO oriented to room and unit, updated on plan of care and safety precautions. Questions answered and pt verbalizes understanding. Siderails up x2, bed level down, call light within reach, no further questions or concerns at this time. Gaurang Perkins R.N.

## 2021-06-25 NOTE — PROGRESS NOTES
S: Pt is feeling discomfort with CTX.      O:    Vitals:    06/24/21 1340 06/24/21 1428 06/24/21 1830 06/24/21 1906   BP: 123/75 120/65 124/94 136/96   Pulse: 73 88 81 65   Resp: 18  18 18   Temp: 36.7 °C (98.1 °F)  36.6 °C (97.9 °F) 36.6 °C (97.8 °F)   TempSrc: Temporal  Temporal Temporal   SpO2:       Weight:       Height:               FHTs:  Baseline 155, + accels, variable decels, moderate variability        [place]: Contractions q 3 minutes, firm to palpation, pitocin @ 14 milliunits        SVE: 4/80/+1`     A/P:  1.  IUP @ 39w5d            2.  Cat 2 FHTs             3.  AROM of fore bag clear             4.  Will reassess as indicated    Elisa Mckeon CNM, APRN

## 2021-06-25 NOTE — CARE PLAN
The patient is Stable - Low risk of patient condition declining or worsening         Progress made toward(s) clinical / shift goals:  Remain normotensive and free of infection.    Patient is not progressing towards the following goals:BP's evaluated.  Vaginal exams limited to reduce risk of infection.  Problem: Knowledge Deficit - L&D  Goal: Patient and family/caregivers will demonstrate understanding of plan of care, disease process/condition, diagnostic tests and medications  Outcome: Progressing     Problem: Psychosocial - L&D  Goal: Patient's level of anxiety will decrease  Outcome: Progressing  Goal: Patient will be able to discuss coping skills during hospitalization  Outcome: Progressing  Goal: Patient's ability to re-evaluate and adapt role responsibilities will improve  Outcome: Progressing  Goal: Spiritual and cultural needs incorporated into hospitalization  Outcome: Progressing     Problem: Risk for Venous Thromboembolism (VTE)  Goal: VTE prevention measures will be implemented and patient will remain free from VTE  Outcome: Progressing     Problem: Risk for Infection and Impaired Wound Healing  Goal: Patient will remain free from infection  Outcome: Progressing  Goal: Patient's wound/surgical incision will decrease in size and heals properly  Outcome: Progressing

## 2021-06-25 NOTE — ANESTHESIA TIME REPORT
Anesthesia Start and Stop Event Times     Date Time Event    6/24/2021 2104 Ready for Procedure     2110 Anesthesia Start    6/25/2021 0140 Anesthesia Stop        Responsible Staff  06/24/21 to 06/25/21    Name Role Begin End    Aaron Gutierrez M.D. Anesth 2110 0140        Preop Diagnosis (Free Text):  Pre-op Diagnosis             Preop Diagnosis (Codes):    Post op Diagnosis  Pain during labor  henry pregnancy at 39+5 weeks gestation, labor pain    Premium Reason  A. 3PM - 7AM    Comments:

## 2021-06-25 NOTE — PROGRESS NOTES
Progress Note:    Called to bedside about 0220 by RN, patient had additional blood clots after fundal massage. Bimanual massage was done, and lower uterine segment was found to be boggy. Clots were evacuated, 1000 mg of cytotec was administered per rectum. Gloves were changed and bimanual massage was repeated. Lower uterine segment and fundus were found to be firm about 5 minutes after administration of cytotec.    Patient denied feeling dizzy or lightheaded, no shortness of breath  No pallor noted    Weights were taken: 430 cc of blood  Total blood loss 200 during delivery + 430 cc = 630 cc.    Ancef 2g ordered for prophylaxis  TXA 1000 mg ordered to manage hemorrhage    Patient tolerated well. All questions answered.     Kimi Beaver M.D.  6/25/2021

## 2021-06-26 ENCOUNTER — PHARMACY VISIT (OUTPATIENT)
Dept: PHARMACY | Facility: MEDICAL CENTER | Age: 26
End: 2021-06-26
Payer: COMMERCIAL

## 2021-06-26 VITALS
HEART RATE: 77 BPM | RESPIRATION RATE: 19 BRPM | TEMPERATURE: 98.1 F | WEIGHT: 191 LBS | BODY MASS INDEX: 31.82 KG/M2 | HEIGHT: 65 IN | SYSTOLIC BLOOD PRESSURE: 128 MMHG | OXYGEN SATURATION: 96 % | DIASTOLIC BLOOD PRESSURE: 83 MMHG

## 2021-06-26 PROCEDURE — 700102 HCHG RX REV CODE 250 W/ 637 OVERRIDE(OP): Performed by: STUDENT IN AN ORGANIZED HEALTH CARE EDUCATION/TRAINING PROGRAM

## 2021-06-26 PROCEDURE — 90707 MMR VACCINE SC: CPT | Performed by: FAMILY MEDICINE

## 2021-06-26 PROCEDURE — A9270 NON-COVERED ITEM OR SERVICE: HCPCS | Performed by: STUDENT IN AN ORGANIZED HEALTH CARE EDUCATION/TRAINING PROGRAM

## 2021-06-26 PROCEDURE — 700111 HCHG RX REV CODE 636 W/ 250 OVERRIDE (IP): Performed by: FAMILY MEDICINE

## 2021-06-26 PROCEDURE — 90471 IMMUNIZATION ADMIN: CPT

## 2021-06-26 PROCEDURE — 700102 HCHG RX REV CODE 250 W/ 637 OVERRIDE(OP): Performed by: FAMILY MEDICINE

## 2021-06-26 PROCEDURE — A9270 NON-COVERED ITEM OR SERVICE: HCPCS | Performed by: FAMILY MEDICINE

## 2021-06-26 PROCEDURE — 3E0134Z INTRODUCTION OF SERUM, TOXOID AND VACCINE INTO SUBCUTANEOUS TISSUE, PERCUTANEOUS APPROACH: ICD-10-PCS | Performed by: FAMILY MEDICINE

## 2021-06-26 PROCEDURE — RXMED WILLOW AMBULATORY MEDICATION CHARGE: Performed by: STUDENT IN AN ORGANIZED HEALTH CARE EDUCATION/TRAINING PROGRAM

## 2021-06-26 RX ORDER — ACETAMINOPHEN 500 MG
1000 TABLET ORAL EVERY 8 HOURS PRN
Qty: 30 TABLET | Refills: 0 | Status: SHIPPED | OUTPATIENT
Start: 2021-06-26 | End: 2021-08-24

## 2021-06-26 RX ORDER — PSEUDOEPHEDRINE HCL 30 MG
100 TABLET ORAL 2 TIMES DAILY PRN
Qty: 60 CAPSULE | Refills: 0 | Status: SHIPPED | OUTPATIENT
Start: 2021-06-26 | End: 2021-08-24

## 2021-06-26 RX ORDER — IBUPROFEN 800 MG/1
800 TABLET ORAL EVERY 8 HOURS PRN
Qty: 30 TABLET | Refills: 0 | Status: SHIPPED | OUTPATIENT
Start: 2021-06-26 | End: 2021-08-24

## 2021-06-26 RX ADMIN — IBUPROFEN 800 MG: 800 TABLET, FILM COATED ORAL at 07:58

## 2021-06-26 RX ADMIN — PRENATAL WITH FERROUS FUM AND FOLIC ACID 1 TABLET: 3080; 920; 120; 400; 22; 1.84; 3; 20; 10; 1; 12; 200; 27; 25; 2 TABLET ORAL at 07:58

## 2021-06-26 RX ADMIN — MEASLES, MUMPS, AND RUBELLA VIRUS VACCINE LIVE 0.5 ML: 1000; 12500; 1000 INJECTION, POWDER, LYOPHILIZED, FOR SUSPENSION SUBCUTANEOUS at 14:25

## 2021-06-26 ASSESSMENT — EDINBURGH POSTNATAL DEPRESSION SCALE (EPDS)
I HAVE FELT SCARED OR PANICKY FOR NO GOOD REASON: NO, NOT AT ALL
I HAVE BEEN ABLE TO LAUGH AND SEE THE FUNNY SIDE OF THINGS: AS MUCH AS I ALWAYS COULD
I HAVE BEEN ANXIOUS OR WORRIED FOR NO GOOD REASON: YES, SOMETIMES
I HAVE BEEN SO UNHAPPY THAT I HAVE HAD DIFFICULTY SLEEPING: NOT AT ALL
I HAVE BLAMED MYSELF UNNECESSARILY WHEN THINGS WENT WRONG: NOT VERY OFTEN
I HAVE LOOKED FORWARD WITH ENJOYMENT TO THINGS: AS MUCH AS I EVER DID
I HAVE FELT SAD OR MISERABLE: NO, NOT AT ALL
THINGS HAVE BEEN GETTING ON TOP OF ME: NO, MOST OF THE TIME I HAVE COPED QUITE WELL
THE THOUGHT OF HARMING MYSELF HAS OCCURRED TO ME: NEVER
I HAVE BEEN SO UNHAPPY THAT I HAVE BEEN CRYING: NO, NEVER

## 2021-06-26 ASSESSMENT — PAIN DESCRIPTION - PAIN TYPE
TYPE: ACUTE PAIN

## 2021-06-26 NOTE — DISCHARGE PLANNING
Meds-to-Beds: Discharge prescription orders listed below delivered to patient's bedside/Nayeli. DARVIN Waters notified. Written information regarding the dispensed prescriptions was provided to the patient including the phone number of the pharmacy to call for any questions.        [facility]   Home Medication Instructions EMILY:[id number]    Printed on:06/26/21 5023   Medication Information                      acetaminophen (TYLENOL) 500 MG Tab  Take 2 Tablets by mouth every 8 hours as needed.             docusate sodium 100 MG Cap  Take 1 capsule by mouth 2 times a day as needed for Constipation.             ibuprofen (MOTRIN) 800 MG Tab  Take 1 tablet by mouth every 8 hours as needed.                 Megan Colon, PharmD

## 2021-06-26 NOTE — DISCHARGE SUMMARY
Discharge Summary:     Date of Admission: 2021  Date of Discharge: 21      Admitting diagnosis:    1. Pregnancy @ 39w6d  2. SROM  3. History of pre-eclampsia      Discharge Diagnosis:   1. Status post vaginal, spontaneous.  2. Postpartum blood loss of 630cc    Pregnancy Complications: none  Tubal Ligation:  no    History reviewed. No pertinent past medical history.  History reviewed. No pertinent surgical history.  Patient has no known allergies.    Patient Active Problem List   Diagnosis   • Hx of preeclampsia, prior pregnancy, currently pregnant, second trimester   • Hx of postpartum hemorrhage, currently pregnant, second trimester   • Hx of transfusion of packed red blood cells   • Rubella non-immune status, antepartum   • Supervision of other normal pregnancy, antepartum       Hospital Course:   25 y.o. , now para 2, was admitted with the above mentioned diagnosis, underwent Induction of Labor, vaginal, spontaneous. Pt gave birth to baby boy with APGARs of 7/9 and weight pending.  Patient's postpartum course was unremarkable, with progressive advancement in diet , ambulation and toleration of oral analgesia. Patient without complaints today and desires discharge.  For postpartum contraception, pt desires undecided.    Physical Exam:  Temp:  [36.4 °C (97.5 °F)-37.1 °C (98.7 °F)] 36.7 °C (98.1 °F)  Pulse:  [] 77  Resp:  [16-19] 19  BP: (107-128)/(71-83) 128/83  SpO2:  [92 %-97 %] 96 %  Physical Exam  General: well  Chest/Breasts: nipples intact   Abdomen: nontender, soft  Fundus: firm and below umbilicus  Incision: not applicable, (vaginal delivery)  Perineum: deferred  Extremities: symmetric and no edema, calves nontender    Current Facility-Administered Medications   Medication Dose   • oxytocin (PITOCIN) infusion (for postpartum)   mL/hr   • ibuprofen (MOTRIN) tablet 800 mg  800 mg   • acetaminophen (TYLENOL) tablet 1,000 mg  1,000 mg   • LR infusion     • measles, mumps and  rubella vaccine (MMR) injection 0.5 mL  0.5 mL   • docusate sodium (COLACE) capsule 100 mg  100 mg   • bisacodyl (DULCOLAX) suppository 10 mg  10 mg   • magnesium hydroxide (MILK OF MAGNESIA) suspension 30 mL  30 mL   • prenatal plus vitamin (STUARTNATAL 1+1) 27-1 MG tablet 1 tablet  1 tablet   • ondansetron (ZOFRAN ODT) dispertab 4 mg  4 mg    Or   • ondansetron (ZOFRAN) syringe/vial injection 4 mg  4 mg       Recent Labs     21  1335 21  1010   WBC 8.7 14.7*   RBC 5.10 4.47   HEMOGLOBIN 14.4 12.6   HEMATOCRIT 43.0 37.8   MCV 84.3 84.6   MCH 28.2 28.2   MCHC 33.5* 33.3*   RDW 45.5 45.0   PLATELETCT 201 172   MPV 11.9 12.6       Activity:   Discharge to home  Pelvic Rest x 6 weeks  Call or come to ED for: heavy vaginal bleeding, fever >100.4, severe abdominal pain, severe headache, chest pain, shortness of breath,  N/V, incisional drainage, or other concerns.      Assessment:  Course complicated by 630cc blood loss     Follow up:  [facility] in 5 weeks for vaginal delivery; 1 week for incision check for  delivery.      Discharge Instructions:  Pelvic rest x 6 weeks  No heavy lifting until cleared by physician  Return to ED or come to the office for severe headache, shortness of breath, chest pain, heavy vaginal bleeding, incisional drainage, foul smelling vaginal discharge, or fever >100.4     Discharge Meds:   Current Outpatient Medications   Medication Sig Dispense Refill   • acetaminophen (TYLENOL) 500 MG Tab Take 2 Tablets by mouth every 8 hours as needed. 30 tablet 0   • docusate sodium 100 MG Cap Take 100 mg by mouth 2 times a day as needed for Constipation. 60 capsule 0   • ibuprofen (MOTRIN) 800 MG Tab Take 1 tablet by mouth every 8 hours as needed. 30 tablet 0

## 2021-06-26 NOTE — DISCHARGE INSTRUCTIONS
PATIENT DISCHARGE EDUCATION INSTRUCTION SHEET  REASONS TO CALL YOUR OBSTETRICIAN  · Persistent fever, shaking, chills (Temperature higher than 100.4) may indicate you have an infection  · Heavy bleeding: soaking more than 1 pad per hour; Passing clots an egg-sized clot or bigger may mean you have an postpartum hemorrhage  · Foul odor from vagina or bad smelling or discolored discharge or blood  · Breast infection (Mastitis symptoms); breast pain, chills, fever, redness or red streaks, may feel flu like symptoms  · Urinary pain, burning or frequency  · Incision that is not healing, increased redness, swelling, tenderness or pain, or any pus from episiotomy or  site may mean you have an infection  · Redness, swelling, warmth, or painful to touch in the calf area of your leg may mean you have a blood clot  · Severe or intensified depression, thoughts or feelings of wanting to hurt yourself or someone else   · Pain in chest, obstructed breathing or shortness of breath (trouble catching your breath) may mean you are having a postpartum complication. Call your provider immediately   · Headache that does not get better, even after taking medicine, a bad headache with vision changes or pain in the upper right area of your belly may mean you have high blood pressure or post birth preeclampsia. Call your provider immediately    HAND WASHING  All family and friends should wash their hands:  · Before and after holding the baby  · Before feeding the baby  · After using the restroom or changing the baby's diaper    WOUND CARE  Ask your physician for additional care instructions. In general:  ·  Incision:  · May shower and pat incision dry   · Keep the incision clean and dry  · There should not be any opening or pus from the incision  · Continue to walk at home 3 times a day   · Do NOT lift anything heavier than your baby (over 10 pounds)  · Encourage family to help participate in care of the  to allow  rest and mom time to heal  · Episiotomy/Laceration  · May use brissa-spray bottle, witch hazel pads and dermaplast spray for comfort  · Use brissa-spray bottle after urinating to cleanse perineal area  · To prevent burning during urination spray brissa-water bottle on labial area   · Pat perineal area dry until episiotomy/laceration is healed  · Continue to use brissa-bottle until bleeding stops as needed  · If have a 2nd degree laceration or greater, a Sitz bath can offer relief from soreness, burning, and inflammation   · Sitz Bath   · Sit in 6 inches of warm water and soak laceration as needed until the laceration heals    VAGINAL CARE AND BLEEDING  · Nothing inside vagina for 6 weeks:   · No sexual intercourse, tampons or douching  · Bleeding may continue for 2-4 weeks. Amount and color may vary  · Soaking 1 pad or more in an hour for several hours is considered heavy bleeding  · Passing large egg sized blood clots can be concerning  · If you feel like you have heavy bleeding or are having increasing amount of blood clots call your Obstetrician immediately  · If you begin feeling faint upon standing, feeling sick to your stomach, have clammy skin, a really fast heartbeat, have chills, start feeling confused, dizzy, sleepy or weak, or feeling like you're going to faint call your Obstetrician immediately    HYPERTENSION   Preeclampsia or gestational hypertension are types of high blood pressure that only pregnant women can get. It is important for you to be aware of symptoms to seek early intervention and treatment. If you have any of these symptoms immediately call your Obstetrician    · Vision changes or blurred vision   · Severe headache or pain that is unrelieved with medication and will not go away  · Persistent pain in upper abdomen or shoulder   · Increased swelling of face, feet, or hands  · Difficulty breathing or shortness of breath at rest  · Urinating less than usual    URINATION AND BOWEL MOVEMENTS  · Eating  "more fiber (bran cereal, fruits, and vegetables) and drinking plenty of fluids will help to avoid constipation  · Urinary frequency and urgency after childbirth is normal  · If you experience any urinary pain, burning or frequency call your provider    BIRTH CONTROL  · It is possible to become pregnant at any time after delivery and while breastfeeding  · Plan to discuss a method of birth control with your physician at your post delivery follow up visit    POSTPARTUM BLUES  During the first few days after birth, you may experience a sense of the \"blues\" which may include impatience, irritability or even crying. These feelings come and go quickly. However, as many as 1 in 10 women experience emotional symptoms known as postpartum depression.     POSTPARTUM DEPRESSION    May start as early as the second or third day after delivery or take several weeks or months to develop. Symptoms of \"blues\" are present, but are more intense: Crying spells; loss of appetite; feelings of hopelessness or loss of control; fear of touching the baby; over concern or no concern at all about the baby; little or no concern about your own appearance/caring for yourself; and/or inability to sleep or excessive sleeping. Contact your Obstetrician if you are experiencing any of these symptoms     PREVENTING SHAKEN BABY  If you are angry or stressed, PUT THE BABY IN THE CRIB, step away, take some deep breaths, and wait until you are calm to care for the baby. DO NOT SHAKE THE BABY. You are not alone, call a supporter for help.  · [facility] 24/7 crisis call line (033-972-1167) or (1-125.880.3558)  · You can also text them, text \"ANSWER\" (735036)      "

## 2021-06-27 NOTE — PROGRESS NOTES
Patient education and discharge instructions reviewed with patient with stated understanding by patient.  Patient states all questions have been answered and denies any problems.

## 2021-06-27 NOTE — PROGRESS NOTES
Discharge teaching completed by Kavya BOSTON. Pt denies any questions or concerns at this time. Pt ready to discharge. Pt taken to  area in wheelchair.

## 2021-06-28 ENCOUNTER — TELEPHONE (OUTPATIENT)
Dept: OBGYN | Facility: CLINIC | Age: 26
End: 2021-06-28

## 2021-08-24 ENCOUNTER — POST PARTUM (OUTPATIENT)
Dept: OBGYN | Facility: CLINIC | Age: 26
End: 2021-08-24
Payer: COMMERCIAL

## 2021-08-24 VITALS — DIASTOLIC BLOOD PRESSURE: 72 MMHG | SYSTOLIC BLOOD PRESSURE: 116 MMHG | WEIGHT: 161 LBS | BODY MASS INDEX: 26.79 KG/M2

## 2021-08-24 PROBLEM — O09.899 RUBELLA NON-IMMUNE STATUS, ANTEPARTUM: Status: RESOLVED | Noted: 2021-01-21 | Resolved: 2021-08-24

## 2021-08-24 PROBLEM — O09.292 HX OF PREECLAMPSIA, PRIOR PREGNANCY, CURRENTLY PREGNANT, SECOND TRIMESTER: Status: RESOLVED | Noted: 2021-01-19 | Resolved: 2021-08-24

## 2021-08-24 PROBLEM — Z28.39 RUBELLA NON-IMMUNE STATUS, ANTEPARTUM: Status: RESOLVED | Noted: 2021-01-21 | Resolved: 2021-08-24

## 2021-08-24 PROBLEM — Z34.80 SUPERVISION OF OTHER NORMAL PREGNANCY, ANTEPARTUM: Status: RESOLVED | Noted: 2021-06-22 | Resolved: 2021-08-24

## 2021-08-24 PROCEDURE — 0503F POSTPARTUM CARE VISIT: CPT | Performed by: ADVANCED PRACTICE MIDWIFE

## 2021-08-24 RX ORDER — ACETAMINOPHEN AND CODEINE PHOSPHATE 120; 12 MG/5ML; MG/5ML
1 SOLUTION ORAL DAILY
Qty: 28 TABLET | Refills: 11 | Status: SHIPPED | OUTPATIENT
Start: 2021-08-24 | End: 2023-03-01

## 2021-08-24 ASSESSMENT — EDINBURGH POSTNATAL DEPRESSION SCALE (EPDS)
TOTAL SCORE: 12
I HAVE BEEN ANXIOUS OR WORRIED FOR NO GOOD REASON: YES, SOMETIMES
I HAVE BEEN SO UNHAPPY THAT I HAVE HAD DIFFICULTY SLEEPING: NOT VERY OFTEN
I HAVE FELT SAD OR MISERABLE: YES, QUITE OFTEN
I HAVE BLAMED MYSELF UNNECESSARILY WHEN THINGS WENT WRONG: NOT VERY OFTEN
I HAVE BEEN SO UNHAPPY THAT I HAVE BEEN CRYING: NO, NEVER
THE THOUGHT OF HARMING MYSELF HAS OCCURRED TO ME: NEVER
I HAVE LOOKED FORWARD WITH ENJOYMENT TO THINGS: DEFINITELY LESS THAN I USED TO
THINGS HAVE BEEN GETTING ON TOP OF ME: YES, SOMETIMES I HAVEN'T BEEN COPING AS WELL AS USUAL
I HAVE FELT SCARED OR PANICKY FOR NO GOOD REASON: NO, NOT MUCH
I HAVE BEEN ABLE TO LAUGH AND SEE THE FUNNY SIDE OF THINGS: NOT QUITE SO MUCH NOW

## 2021-08-24 ASSESSMENT — FIBROSIS 4 INDEX: FIB4 SCORE: 0.92

## 2021-08-24 NOTE — NON-PROVIDER
Pt here today for postpartum exam.  Delivery type: vaginal  Currently :breast feeding   Desired BCM: pill  LMP: not yet   Phone # [phone]  Pharmacy verified   EPDS 12

## 2021-08-24 NOTE — PROGRESS NOTES
Subjective   Subjective:    Kadi Soliman is a 25 y.o. female who presents for her postpartum exam 8 weeks following . Her prenatal course was uncomplicated.  Her delivery was uncomplicated. Her method of feeding infant is breast. She desires a pill for her birth control method. Reports sex prior to this appointment. Patient denies crying spells, irritability, or mood swings. She admits to difficulty sleeping due to watching the baby.     Pap WNL on 2021    Eating a regular diet without difficulty.   Bowel movement are Normal but hard. She feels some pain with bowel movements. Recently changed diet to intermittent fasting and has been drinking more water.     Breast problems no  Dysuria no  Vaginal bleeding no  Vaginal itching no      Problem List     Patient Active Problem List    Diagnosis Date Noted   • Supervision of other normal pregnancy, antepartum 2021   • Rubella non-immune status, antepartum 2021   • Hx of preeclampsia, prior pregnancy, currently pregnant, second trimester 2021   • Hx of postpartum hemorrhage, currently pregnant, second trimester 2021   • Hx of transfusion of packed red blood cells 2021       Objective    EDPS 12      Lab:No results found for this or any previous visit (from the past 1008 hour(s)).  Vitals:    21 1042   BP: 116/72   Weight: 73 kg (161 lb)     Vitals:    21 1042   BP: 116/72     Objective    Well nourished female in no acute distress  A& O x 3  Heart RRR  No edema noted and pulses WNL bilaterally in lower extremities; no claudication  BS x 4; no guarding or tenderness  Breasts: no erythema or discharge. No masses or tenderness.     Genitourinary: Pelvic exam was performed with patient supine. External genitalia with no abnormal pigmentation, labial fusion, rash, tenderness, lesion or injury to the labia bilaterally. Vagina is moist with no lesions, foul discharge, erythema, tenderness or bleeding. Mild discomfort with  manipulation of introitus. Epis/laceration well healed.     Assessment   Assessment:    1. Postpartum Exam  2. General Counseling and Advice on Contraception  3. Screening for Postpartum Depression    Plan   Plan:    1. Breastfeeding support   2. Continue PNV   3. Contraceptive counseling- discussed vasectomy in partner. Will start pills at this time. Patient aware she is not protected until second pack of pills.   4. Discussed diet, exercise and resumption of sexual activity.  Discussed signs and symptoms of stress incontinence and reviewed pelvic floor exercises.    5. Follow up 1 year or PRN.

## 2021-11-01 ENCOUNTER — OFFICE VISIT (OUTPATIENT)
Dept: URGENT CARE | Facility: PHYSICIAN GROUP | Age: 26
End: 2021-11-01
Payer: MEDICAID

## 2021-11-01 ENCOUNTER — HOSPITAL ENCOUNTER (OUTPATIENT)
Facility: MEDICAL CENTER | Age: 26
End: 2021-11-01
Attending: PHYSICIAN ASSISTANT
Payer: MEDICAID

## 2021-11-01 VITALS
TEMPERATURE: 98.1 F | HEART RATE: 108 BPM | SYSTOLIC BLOOD PRESSURE: 112 MMHG | HEIGHT: 64 IN | DIASTOLIC BLOOD PRESSURE: 84 MMHG | RESPIRATION RATE: 16 BRPM | BODY MASS INDEX: 25.27 KG/M2 | OXYGEN SATURATION: 97 % | WEIGHT: 148 LBS

## 2021-11-01 DIAGNOSIS — U07.1 COVID-19 VIRUS INFECTION: ICD-10-CM

## 2021-11-01 LAB — COVID ORDER STATUS COVID19: NORMAL

## 2021-11-01 PROCEDURE — U0003 INFECTIOUS AGENT DETECTION BY NUCLEIC ACID (DNA OR RNA); SEVERE ACUTE RESPIRATORY SYNDROME CORONAVIRUS 2 (SARS-COV-2) (CORONAVIRUS DISEASE [COVID-19]), AMPLIFIED PROBE TECHNIQUE, MAKING USE OF HIGH THROUGHPUT TECHNOLOGIES AS DESCRIBED BY CMS-2020-01-R: HCPCS

## 2021-11-01 PROCEDURE — 99213 OFFICE O/P EST LOW 20 MIN: CPT | Mod: CS | Performed by: PHYSICIAN ASSISTANT

## 2021-11-01 PROCEDURE — U0005 INFEC AGEN DETEC AMPLI PROBE: HCPCS

## 2021-11-01 ASSESSMENT — FIBROSIS 4 INDEX: FIB4 SCORE: 0.92

## 2021-11-01 NOTE — PROGRESS NOTES
"Chief Complaint   Patient presents with   • Coronavirus Screening     Positive at-home test, wants \"actual test\", Cough, fatigue, headache sweats, and chills, x4days        HISTORY OF PRESENT ILLNESS: Patient is a 25 y.o. female who presents today for the following:    Positive at home test   Cough x 4 days  + fever, chills HA, fatigue  mucinex does help  Dry cough, pain with coughing  Lost taste and smell today  Currently breastfeeding    Patient Active Problem List    Diagnosis Date Noted   • Hx of postpartum hemorrhage, currently pregnant, second trimester 01/19/2021   • Hx of transfusion of packed red blood cells 01/19/2021       Allergies:Patient has no known allergies.    Current Outpatient Medications Ordered in Epic   Medication Sig Dispense Refill   • norethindrone (MICRONOR) 0.35 MG tablet Take 1 Tablet by mouth every day. (Patient not taking: Reported on 11/1/2021) 28 Tablet 11   • PRENATAL VIT W/ FE BISG-FA PO Take  by mouth. (Patient not taking: Reported on 11/1/2021)       No current [facility]-ordered facility-administered medications on file.       History reviewed. No pertinent past medical history.    Social History     Tobacco Use   • Smoking status: Never Smoker   • Smokeless tobacco: Never Used   Vaping Use   • Vaping Use: Never used   Substance Use Topics   • Alcohol use: Not Currently   • Drug use: No       Family Status   Relation Name Status   • Mo  Alive   • Fa  Alive   • Bro 3 Alive   • MGMo  Alive     Family History   Problem Relation Age of Onset   • No Known Problems Mother    • No Known Problems Father    • No Known Problems Brother    • Heart Disease Maternal Grandmother        Review of Systems:   Pertinent systems reviewed with the patient.    Exam:  /84   Pulse (!) 108   Temp 36.7 °C (98.1 °F) (Temporal)   Resp 16   Ht 1.626 m (5' 4\")   Wt 67.1 kg (148 lb)   SpO2 97%   General: Well developed, well nourished. No distress.    Eye: PERRL/EOMI; conjunctivae clear, lids " normal.  ENMT: Lips without lesions, MMM. Oropharynx is clear. Bilateral TMs are within normal limits.  Pulmonary: Unlabored respiratory effort. Lungs clear to auscultation, no wheezes, no rhonchi.    Cardiovascular: Regular rate and rhythm without murmur.   Neurologic: Grossly nonfocal. No facial asymmetry noted.  Lymph: No cervical lymphadenopathy noted.  Skin: Warm, dry, good turgor. No rashes in visible areas.   Psych: Normal mood. Alert and oriented to person, place and time.    Assessment/Plan:  Discussed likely viral etiology.  Vitals and exam are unremarkable.  Low suspicion for pneumonia. Patient will be tested for COVID and has been advised to quarantine until results are available. Discussed appropriate over-the-counter symptomatic medication, and when to return to clinic. Follow up for worsening or persistent symptoms.  1. COVID-19 virus infection  SARS-CoV-2, PCR (In-House): Collect NP OR nasal swab in [facility]

## 2021-11-02 LAB
SARS-COV-2 RNA RESP QL NAA+PROBE: DETECTED
SPECIMEN SOURCE: ABNORMAL

## 2021-12-20 ENCOUNTER — NON-PROVIDER VISIT (OUTPATIENT)
Dept: URGENT CARE | Facility: PHYSICIAN GROUP | Age: 26
End: 2021-12-20
Payer: MEDICAID

## 2021-12-20 DIAGNOSIS — Z02.1 ENCOUNTER FOR PRE-EMPLOYMENT EXAMINATION: ICD-10-CM

## 2021-12-20 PROCEDURE — 86580 TB INTRADERMAL TEST: CPT | Performed by: PHYSICIAN ASSISTANT

## 2021-12-23 ENCOUNTER — NON-PROVIDER VISIT (OUTPATIENT)
Dept: URGENT CARE | Facility: PHYSICIAN GROUP | Age: 26
End: 2021-12-23
Payer: MEDICAID

## 2021-12-23 NOTE — NON-PROVIDER
Kadi Soliman is a 26 y.o. female here for a non-provider visit for PPD reading -- Step 1 of 1.      1.  Resulted in Epic under enter/edit results? Yes   2.  TB evaluation questionnaire scanned into chart and original given to patient?Yes      3. Was induration greater than 0 mm? No.    Routed to PCP? No

## 2022-01-18 ENCOUNTER — OFFICE VISIT (OUTPATIENT)
Dept: URGENT CARE | Facility: PHYSICIAN GROUP | Age: 27
End: 2022-01-18
Payer: MEDICAID

## 2022-01-18 ENCOUNTER — HOSPITAL ENCOUNTER (OUTPATIENT)
Facility: MEDICAL CENTER | Age: 27
End: 2022-01-18
Attending: FAMILY MEDICINE
Payer: MEDICAID

## 2022-01-18 VITALS
SYSTOLIC BLOOD PRESSURE: 126 MMHG | DIASTOLIC BLOOD PRESSURE: 98 MMHG | RESPIRATION RATE: 16 BRPM | WEIGHT: 151 LBS | BODY MASS INDEX: 25.16 KG/M2 | HEART RATE: 93 BPM | HEIGHT: 65 IN | TEMPERATURE: 98 F

## 2022-01-18 DIAGNOSIS — J98.8 RTI (RESPIRATORY TRACT INFECTION): ICD-10-CM

## 2022-01-18 PROCEDURE — 99213 OFFICE O/P EST LOW 20 MIN: CPT | Performed by: FAMILY MEDICINE

## 2022-01-18 PROCEDURE — 0240U HCHG SARS-COV-2 COVID-19 NFCT DS RESP RNA 3 TRGT MIC: CPT

## 2022-01-18 ASSESSMENT — ENCOUNTER SYMPTOMS
COUGH: 1
SORE THROAT: 1

## 2022-01-18 ASSESSMENT — FIBROSIS 4 INDEX: FIB4 SCORE: 0.96

## 2022-01-18 NOTE — PROGRESS NOTES
"Subjective     Kadi Soliman is a 26 y.o. female who presents with Coronavirus Screening (cough and sore throat, x1day )      - This is a pleasant and nontoxic appearing 26 y.o. female who has come to the walk-in clinic today for:    #1) 1 day w/ sore throat and a little cough and head pressure, No NVFC. Feels well otherwise.       ALLERGIES:  Patient has no known allergies.     PMH:  History reviewed. No pertinent past medical history.     PSH:  History reviewed. No pertinent surgical history.    MEDS:    Current Outpatient Medications:   •  norethindrone (MICRONOR) 0.35 MG tablet, Take 1 Tablet by mouth every day., Disp: 28 Tablet, Rfl: 11    ** I have documented what I find to be significant in regards to past medical, social, family and surgical history  in my HPI or under PMH/PSH/FH review section, otherwise it is noncontributory **       HPI    Review of Systems   HENT: Positive for sore throat.    Respiratory: Positive for cough.    All other systems reviewed and are negative.             Objective     /98   Pulse 93   Temp 36.7 °C (98 °F) (Temporal)   Resp 16   Ht 1.651 m (5' 5\")   Wt 68.5 kg (151 lb)   BMI 25.13 kg/m²      Physical Exam  Vitals and nursing note reviewed.   Constitutional:       General: She is not in acute distress.     Appearance: Normal appearance. She is well-developed.   HENT:      Head: Normocephalic and atraumatic.      Mouth/Throat:      Mouth: Mucous membranes are moist.      Pharynx: Oropharynx is clear.   Eyes:      General: No scleral icterus.  Cardiovascular:      Heart sounds: Normal heart sounds. No murmur heard.      Pulmonary:      Effort: Pulmonary effort is normal. No respiratory distress.      Breath sounds: Normal breath sounds.   Skin:     Coloration: Skin is not jaundiced or pale.   Neurological:      Mental Status: She is alert.      Motor: No abnormal muscle tone.   Psychiatric:         Mood and Affect: Mood normal.         Behavior: Behavior " normal.         Assessment & Plan       1. RTI (respiratory tract infection)  CoV-2 and Flu A/B by PCR (24 hour In-House): Collect NP swab in VTM    POCT SARS-COV Antigen [place] (Symptomatic Only)    CANCELED: POCT Rapid Strep A       - Dx, plan & d/c instructions discussed   - Self isolate, call back in 2-3 days for CV19 results   - Rest, stay hydrated, OTC Motrin and/or Tylenol as needed  - E.R. precautions discussed     Asked to kindly follow up with their PCP's office in 2-3 days for a recheck, ER if not improving or feeling/getting worse.    Any realistic side effects of medications that may have been given today reviewed.     Patient left in stable condition     POCT results reviewed/discussed

## 2022-01-18 NOTE — LETTER
January 18, 2022         Patient: Kadi Soliman   YOB: 1995   Date of Visit: 1/18/2022           To Whom it May Concern:    Kadi Soliman was seen in my clinic on 1/18/2022. She may return to work in 2-3 days.    If you have any questions or concerns, please don't hesitate to call.        Sincerely,           Og Little M.D.  Electronically Signed

## 2022-01-19 LAB
FLUAV RNA SPEC QL NAA+PROBE: NEGATIVE
FLUBV RNA SPEC QL NAA+PROBE: NEGATIVE
SARS-COV-2 RNA RESP QL NAA+PROBE: NOTDETECTED
SPECIMEN SOURCE: NORMAL

## 2022-05-09 ENCOUNTER — OFFICE VISIT (OUTPATIENT)
Dept: URGENT CARE | Facility: PHYSICIAN GROUP | Age: 27
End: 2022-05-09
Payer: MEDICAID

## 2022-05-09 VITALS
WEIGHT: 151 LBS | HEIGHT: 65 IN | SYSTOLIC BLOOD PRESSURE: 122 MMHG | DIASTOLIC BLOOD PRESSURE: 66 MMHG | OXYGEN SATURATION: 99 % | RESPIRATION RATE: 16 BRPM | BODY MASS INDEX: 25.16 KG/M2 | HEART RATE: 96 BPM | TEMPERATURE: 96.7 F

## 2022-05-09 DIAGNOSIS — K04.7 DENTAL ABSCESS: ICD-10-CM

## 2022-05-09 PROCEDURE — 99213 OFFICE O/P EST LOW 20 MIN: CPT | Performed by: FAMILY MEDICINE

## 2022-05-09 RX ORDER — AMOXICILLIN AND CLAVULANATE POTASSIUM 875; 125 MG/1; MG/1
1 TABLET, FILM COATED ORAL 2 TIMES DAILY
Qty: 14 TABLET | Refills: 0 | Status: SHIPPED | OUTPATIENT
Start: 2022-05-09 | End: 2022-05-16

## 2022-05-09 ASSESSMENT — ENCOUNTER SYMPTOMS
CHILLS: 0
FEVER: 0
SORE THROAT: 0
VOMITING: 0
MYALGIAS: 0
NAUSEA: 0
COUGH: 0
DIZZINESS: 0
SHORTNESS OF BREATH: 0
EYE REDNESS: 0

## 2022-05-09 ASSESSMENT — FIBROSIS 4 INDEX: FIB4 SCORE: 0.96

## 2022-05-10 NOTE — PROGRESS NOTES
Subjective:   Kadi Soliman is a 26 y.o. female who presents for Dental Pain (Tooth pain has dental appt on Friday. Dentist told her to come here for antibiotics)        Dental Pain   This is a new (Reports right lower molar pain and swelling over the past 3 days, appoint with dentist pending) problem. Episode onset: 3 days. The problem occurs constantly. The problem has been unchanged. Pertinent negatives include no difficulty swallowing or fever. She has tried heat for the symptoms. The treatment provided mild relief.     PMH:  has no past medical history of Addisons disease (Formerly [facility]), Adrenal disorder (Formerly [facility]), Allergy, Anxiety, Arrhythmia, Arthritis, ASTHMA, Cancer (Formerly [facility]), Cataract, CHF (congestive heart failure) (Formerly [facility]), Clotting disorder (Formerly [facility]), COPD (chronic obstructive pulmonary disease) (Formerly [facility]), Cushings syndrome (Formerly [facility]), Depression, Diabetes, Diabetic neuropathy (Formerly [facility]), GERD (gastroesophageal reflux disease), Glaucoma, Goiter, Head ache, Heart attack ([facility]ly [facility]), Heart murmur, HIV (human immunodeficiency virus infection) (Formerly [facility]), Hypertension, IBD (inflammatory bowel disease), Kidney disease, Meningitis, Migraine, Muscle disorder, Osteoporosis, Parathyroid disorder ([facility]ly [facility]), Pituitary disease ([facility]), Pulmonary emphysema (Formerly [facility]), Seizure (Formerly [facility]), Sickle cell disease (Formerly [facility]), Stroke ([facility]), Substance abuse (Formerly [facility]), Thyroid disease, Tuberculosis, Ulcer, or Urinary tract infection, site not specified.  MEDS:   Current Outpatient Medications:   •  amoxicillin-clavulanate (AUGMENTIN) 875-125 MG Tab, Take 1 Tablet by mouth 2 times a day for 7 days., Disp: 14 Tablet, Rfl: 0  •  norethindrone (MICRONOR) 0.35 MG tablet, Take 1 Tablet by mouth every day. (Patient not taking: Reported on 5/9/2022), Disp: 28 Tablet, Rfl: 11  ALLERGIES: No Known Allergies  SURGHX: History reviewed. No pertinent surgical history.  SOCHX:  reports that she has never smoked. She has never used smokeless tobacco. She reports previous alcohol use. She reports that she  "does not use drugs.  FH:   Family History   Problem Relation Age of Onset   • No Known Problems Mother    • No Known Problems Father    • No Known Problems Brother    • Heart Disease Maternal Grandmother      Review of Systems   Constitutional: Negative for chills and fever.   HENT: Negative for sore throat.    Eyes: Negative for redness.   Respiratory: Negative for cough and shortness of breath.    Gastrointestinal: Negative for nausea and vomiting.   Musculoskeletal: Negative for myalgias.   Skin: Negative for rash.   Neurological: Negative for dizziness.        Objective:   /66   Pulse 96   Temp 35.9 °C (96.7 °F) (Temporal)   Resp 16   Ht 1.651 m (5' 5\")   Wt 68.5 kg (151 lb)   SpO2 99%   BMI 25.13 kg/m²   Physical Exam  Vitals and nursing note reviewed.   Constitutional:       General: She is not in acute distress.     Appearance: She is well-developed.   HENT:      Head: Normocephalic and atraumatic.      Right Ear: External ear normal.      Left Ear: External ear normal.      Nose: Nose normal.      Mouth/Throat:      Mouth: Mucous membranes are moist.      Dentition: Abnormal dentition. Dental tenderness, gingival swelling and dental abscesses present.      Pharynx: No posterior oropharyngeal erythema.     Eyes:      Conjunctiva/sclera: Conjunctivae normal.   Cardiovascular:      Rate and Rhythm: Normal rate.   Pulmonary:      Effort: Pulmonary effort is normal. No respiratory distress.      Breath sounds: Normal breath sounds. No wheezing or rhonchi.   Abdominal:      General: There is no distension.   Musculoskeletal:         General: Normal range of motion.   Skin:     General: Skin is warm and dry.   Neurological:      General: No focal deficit present.      Mental Status: She is alert and oriented to person, place, and time. Mental status is at baseline.      Gait: Gait (gait at baseline) normal.   Psychiatric:         Judgment: Judgment normal.           Assessment/Plan:   1. Dental " abscess  - amoxicillin-clavulanate (AUGMENTIN) 875-125 MG Tab; Take 1 Tablet by mouth 2 times a day for 7 days.  Dispense: 14 Tablet; Refill: 0        Medical Decision Making/Course:  In the course of preparing for this visit with review of the pertinent past medical history, recent and past clinic visits, current medications, and performing chart, immunization, medical history and medication reconciliation, and in the further course of obtaining the current history pertinent to the clinic visit today, performing an exam and evaluation, ordering and independently evaluating labs, tests  , and/or procedures, prescribing any recommended new medications as noted above, providing any pertinent counseling and education and recommending further coordination of care including recommendations to follow-up with dentist/oral surgeon for definitive management and recommendations for symptomatic and supportive measures, at least  12 minutes of total time were spent during this encounter.      Discussed close monitoring, return precautions, and supportive measures of maintaining adequate fluid hydration and caloric intake, relative rest and symptom management as needed for pain and/or fever.    Differential diagnosis, natural history, supportive care, and indications for immediate follow-up discussed.     Advised the patient to follow-up with the primary care physician for recheck, reevaluation, and consideration of further management.    Please note that this dictation was created using voice recognition software. I have worked with consultants from the vendor as well as technical experts from [employer] to optimize the interface. I have made every reasonable attempt to correct obvious errors, but I expect that there are errors of grammar and possibly content that I did not discover before finalizing the note.

## 2023-03-01 ENCOUNTER — HOSPITAL ENCOUNTER (OUTPATIENT)
Facility: MEDICAL CENTER | Age: 28
End: 2023-03-01
Attending: FAMILY MEDICINE
Payer: MEDICAID

## 2023-03-01 ENCOUNTER — OFFICE VISIT (OUTPATIENT)
Dept: URGENT CARE | Facility: PHYSICIAN GROUP | Age: 28
End: 2023-03-01
Payer: MEDICAID

## 2023-03-01 VITALS
OXYGEN SATURATION: 96 % | BODY MASS INDEX: 26.66 KG/M2 | HEIGHT: 65 IN | RESPIRATION RATE: 16 BRPM | TEMPERATURE: 98.1 F | DIASTOLIC BLOOD PRESSURE: 106 MMHG | WEIGHT: 160 LBS | HEART RATE: 76 BPM | SYSTOLIC BLOOD PRESSURE: 154 MMHG

## 2023-03-01 DIAGNOSIS — N76.0 ACUTE VAGINITIS: ICD-10-CM

## 2023-03-01 DIAGNOSIS — N39.0 ACUTE URINARY TRACT INFECTION: ICD-10-CM

## 2023-03-01 LAB
APPEARANCE UR: CLEAR
BILIRUB UR STRIP-MCNC: NORMAL MG/DL
COLOR UR AUTO: YELLOW
GLUCOSE UR STRIP.AUTO-MCNC: NORMAL MG/DL
KETONES UR STRIP.AUTO-MCNC: NORMAL MG/DL
LEUKOCYTE ESTERASE UR QL STRIP.AUTO: NORMAL
NITRITE UR QL STRIP.AUTO: NORMAL
PH UR STRIP.AUTO: 5.5 [PH] (ref 5–8)
POCT INT CON NEG: NEGATIVE
POCT INT CON POS: POSITIVE
POCT URINE PREGNANCY TEST: NEGATIVE
PROT UR QL STRIP: NORMAL MG/DL
RBC UR QL AUTO: NORMAL
SP GR UR STRIP.AUTO: <=1.005
UROBILINOGEN UR STRIP-MCNC: 0.2 MG/DL

## 2023-03-01 PROCEDURE — 81002 URINALYSIS NONAUTO W/O SCOPE: CPT | Performed by: FAMILY MEDICINE

## 2023-03-01 PROCEDURE — 87660 TRICHOMONAS VAGIN DIR PROBE: CPT

## 2023-03-01 PROCEDURE — 87491 CHLMYD TRACH DNA AMP PROBE: CPT

## 2023-03-01 PROCEDURE — 81025 URINE PREGNANCY TEST: CPT | Performed by: FAMILY MEDICINE

## 2023-03-01 PROCEDURE — 87510 GARDNER VAG DNA DIR PROBE: CPT

## 2023-03-01 PROCEDURE — 99214 OFFICE O/P EST MOD 30 MIN: CPT | Mod: 25 | Performed by: FAMILY MEDICINE

## 2023-03-01 PROCEDURE — 87480 CANDIDA DNA DIR PROBE: CPT

## 2023-03-01 PROCEDURE — 87591 N.GONORRHOEAE DNA AMP PROB: CPT

## 2023-03-01 PROCEDURE — 87086 URINE CULTURE/COLONY COUNT: CPT

## 2023-03-01 RX ORDER — NITROFURANTOIN 25; 75 MG/1; MG/1
100 CAPSULE ORAL 2 TIMES DAILY
Qty: 10 CAPSULE | Refills: 0 | Status: SHIPPED | OUTPATIENT
Start: 2023-03-01 | End: 2023-03-06

## 2023-03-01 ASSESSMENT — FIBROSIS 4 INDEX: FIB4 SCORE: 1

## 2023-03-02 DIAGNOSIS — N76.0 ACUTE VAGINITIS: ICD-10-CM

## 2023-03-02 DIAGNOSIS — N39.0 ACUTE URINARY TRACT INFECTION: ICD-10-CM

## 2023-03-02 NOTE — PROGRESS NOTES
Chief Complaint:    Chief Complaint   Patient presents with    Dysuria     X2days        History of Present Illness:    Has had dysuria, urinary frequency, urinary urgency, bloody discharge or hematuria, and cramps in lower abdomen. Nitrofurantoin worked and was tolerated for previous UTI treatment. She has had BV more than once on chart review.      Past Medical History:    History reviewed. No pertinent past medical history.    Past Surgical History:    History reviewed. No pertinent surgical history.    Social History:    Social History     Socioeconomic History    Marital status:      Spouse name: Not on file    Number of children: Not on file    Years of education: Not on file    Highest education level: Not on file   Occupational History    Not on file   Tobacco Use    Smoking status: Never    Smokeless tobacco: Never   Vaping Use    Vaping Use: Never used   Substance and Sexual Activity    Alcohol use: Not Currently    Drug use: No    Sexual activity: Yes     Partners: Male     Comment: none. unplanned pregnancy   Other Topics Concern    Not on file   Social History Narrative    Not on file     Social Determinants of Health     Financial Resource Strain: Not on file   Food Insecurity: Not on file   Transportation Needs: Not on file   Physical Activity: Not on file   Stress: Not on file   Social Connections: Not on file   Intimate Partner Violence: Not on file   Housing Stability: Not on file     Family History:    Family History   Problem Relation Age of Onset    No Known Problems Mother     No Known Problems Father     No Known Problems Brother     Heart Disease Maternal Grandmother      Medications:    No current outpatient medications on file prior to visit.     No current facility-administered medications on file prior to visit.     Allergies:    No Known Allergies      Vitals:    Vitals:    03/01/23 1700   BP: (!) 154/106   Pulse: 76   Resp: 16   Temp: 36.7 °C (98.1 °F)   TempSrc: Temporal   SpO2:  "96%   Weight: 72.6 kg (160 lb)   Height: 1.651 m (5' 5\")       Physical Exam:    Constitutional: Vital signs reviewed. Appears well-developed and well-nourished. No acute distress.   Eyes: Sclera white, conjunctivae clear.   ENT: External ears normal. Hearing normal.  Neck: Neck supple.   Pulmonary/Chest: Respirations non-labored.   Musculoskeletal: Normal gait. No muscular atrophy or weakness.  Neurological: Alert and oriented to person, place, and time. Muscle tone normal. Coordination normal.   Skin: No rashes or lesions. Warm, dry, normal turgor.  Psychiatric: Normal mood and affect. Behavior is normal. Judgment and thought content normal.       Diagnostics:    Urine dipstick: trace leukocytes, trace blood.    Urine HCG: negative.      Medical Decision Makin. Acute urinary tract infection  - POCT Urinalysis  - URINE CULTURE(NEW); Future  - nitrofurantoin (MACROBID) 100 MG Cap; Take 1 Capsule by mouth 2 times a day for 5 days.  Dispense: 10 Capsule; Refill: 0  - POCT PREGNANCY    2. Acute vaginitis  - Chlamydia/GC, PCR (Genital/Anal swab); Future  - VAGINAL PATHOGENS DNA PANEL; Future       Discussed with her DDX, management options, and risks, benefits, and alternatives to treatment plan agreed upon.    Has had dysuria, urinary frequency, urinary urgency, bloody discharge or hematuria, and cramps in lower abdomen. Nitrofurantoin worked and was tolerated for previous UTI treatment. She has had BV more than once on chart review.    Urine dipstick: trace leukocytes, trace blood.    Urine HCG: negative.    Agreeable to medication prescribed and send urine for culture and check Vaginal Pathogens and G/C swab tests. She preferred to self-swab, which she did.    Advised test results will show in [facility] in a few days.    Will follow-up if needed while waiting for test results.  "

## 2023-03-03 DIAGNOSIS — B96.89 BACTERIAL VAGINOSIS: ICD-10-CM

## 2023-03-03 DIAGNOSIS — N76.0 BACTERIAL VAGINOSIS: ICD-10-CM

## 2023-03-03 LAB
C TRACH DNA GENITAL QL NAA+PROBE: NEGATIVE
N GONORRHOEA DNA GENITAL QL NAA+PROBE: NEGATIVE
SPECIMEN SOURCE: NORMAL

## 2023-03-03 RX ORDER — METRONIDAZOLE 500 MG/1
TABLET ORAL
Qty: 14 TABLET | Refills: 0 | Status: SHIPPED | OUTPATIENT
Start: 2023-03-03 | End: 2023-03-10

## 2023-03-04 LAB
BACTERIA UR CULT: NORMAL
SIGNIFICANT IND 70042: NORMAL
SITE SITE: NORMAL
SOURCE SOURCE: NORMAL

## 2023-11-28 ENCOUNTER — NON-PROVIDER VISIT (OUTPATIENT)
Dept: URGENT CARE | Facility: PHYSICIAN GROUP | Age: 28
End: 2023-11-28

## 2023-11-28 DIAGNOSIS — Z11.1 ENCOUNTER FOR PPD TEST: ICD-10-CM

## 2023-11-28 PROCEDURE — 86580 TB INTRADERMAL TEST: CPT

## 2023-11-28 NOTE — PROGRESS NOTES
Kadi Soliman is a 27 y.o. female here for a non-provider visit for PPD placement -- Step 1 of 1    Reason for PPD:  work requirement    1. TB evaluation questionnaire completed by patient? Yes      -  If any answers marked yes did you contact a provider prior to placing? Not Indicated  2.  Patient notified to return to clinic for reading on: 11/30-12/1  3.  PPD Placement documentation completed on TB evaluation questionnaire? Yes  4.  Location of TB evaluation questionnaire filed: media and folder

## 2023-11-30 ENCOUNTER — NON-PROVIDER VISIT (OUTPATIENT)
Dept: URGENT CARE | Facility: PHYSICIAN GROUP | Age: 28
End: 2023-11-30

## 2023-11-30 LAB — TB WHEAL 3D P 5 TU DIAM: 0 MM

## 2023-11-30 NOTE — PROGRESS NOTES
Kadi Soliman is a 27 y.o. female here for a non-provider visit for PPD reading -- Step 1 of 1.      1.  Resulted in Epic under enter/edit results? Yes   2.  TB evaluation questionnaire scanned into chart and original given to patient?Yes      3. Was induration greater than 0 mm? No.

## 2024-02-22 ENCOUNTER — OFFICE VISIT (OUTPATIENT)
Dept: URGENT CARE | Facility: PHYSICIAN GROUP | Age: 29
End: 2024-02-22
Payer: MEDICAID

## 2024-02-22 VITALS
OXYGEN SATURATION: 100 % | WEIGHT: 165 LBS | TEMPERATURE: 97.5 F | SYSTOLIC BLOOD PRESSURE: 154 MMHG | RESPIRATION RATE: 18 BRPM | DIASTOLIC BLOOD PRESSURE: 70 MMHG | HEART RATE: 56 BPM | BODY MASS INDEX: 27.49 KG/M2 | HEIGHT: 65 IN

## 2024-02-22 DIAGNOSIS — J02.9 SORE THROAT: ICD-10-CM

## 2024-02-22 LAB
FLUAV RNA SPEC QL NAA+PROBE: NEGATIVE
FLUBV RNA SPEC QL NAA+PROBE: NEGATIVE
RSV RNA SPEC QL NAA+PROBE: NEGATIVE
S PYO DNA SPEC NAA+PROBE: NOT DETECTED
SARS-COV-2 RNA RESP QL NAA+PROBE: NEGATIVE

## 2024-02-22 PROCEDURE — 87651 STREP A DNA AMP PROBE: CPT | Performed by: FAMILY MEDICINE

## 2024-02-22 PROCEDURE — 87637 SARSCOV2&INF A&B&RSV AMP PRB: CPT | Mod: QW | Performed by: FAMILY MEDICINE

## 2024-02-22 PROCEDURE — 3077F SYST BP >= 140 MM HG: CPT | Performed by: FAMILY MEDICINE

## 2024-02-22 PROCEDURE — 99213 OFFICE O/P EST LOW 20 MIN: CPT | Performed by: FAMILY MEDICINE

## 2024-02-22 PROCEDURE — 3078F DIAST BP <80 MM HG: CPT | Performed by: FAMILY MEDICINE

## 2024-02-23 NOTE — PROGRESS NOTES
"CC:  presents with Pharyngitis            Pharyngitis   This is a new problem. The current episode started in the past 3 days. The problem has been unchanged. There has been subj fever. The pain is mild. Associated symptoms include a dry cough. Pertinent negatives include no abdominal pain,   diarrhea, headaches, shortness of breath or vomiting. no exposure to strep or mono.   has tried acetaminophen for the symptoms. The treatment provided mild relief.     Social History     Tobacco Use    Smoking status: Never    Smokeless tobacco: Never   Vaping Use    Vaping Use: Never used   Substance Use Topics    Alcohol use: Not Currently    Drug use: No       No past medical history on file.    Review of Systems    HENT: Positive for sore throat  Respiratory: Negative for  sputum production and shortness of breath.    Cardiovascular: Negative for chest pain.   Gastrointestinal: Negative for nausea, vomiting, abdominal pain and diarrhea.   Genitourinary: Negative.    Neurological: Negative for dizziness and headaches.   All other systems reviewed and are negative.         Objective:   BP (!) 154/70   Pulse (!) 56   Temp 36.4 °C (97.5 °F) (Temporal)   Resp 18   Ht 1.651 m (5' 5\")   Wt 74.8 kg (165 lb)   SpO2 100%         Physical Exam   Constitutional:   oriented to person, place, and time.  appears well-developed and well-nourished. No distress.   HENT:   Head: Normocephalic and atraumatic.   Right Ear: External ear normal.   Left Ear: External ear normal.   Nose: Mucosal edema present. Right sinus exhibits no maxillary sinus tenderness and no frontal sinus tenderness. Left sinus exhibits no maxillary sinus tenderness and no frontal sinus tenderness.   Mouth/Throat: no posterior oropharyngeal exudate.   There is posterior oropharyngeal erythema present. No posterior oropharyngeal edema.   Tonsils 1+ bilaterally     Eyes: Conjunctivae and EOM are normal. Pupils are equal, round, and reactive to light. Right eye exhibits " no discharge. Left eye exhibits no discharge. No scleral icterus.   Neck: Normal range of motion. Neck supple. No JVD present. No tracheal deviation present. No thyromegaly present.   Cardiovascular: Normal rate, regular rhythm, normal heart sounds and intact distal pulses.  Exam reveals no friction rub.    No murmur heard.  Pulmonary/Chest: Effort normal and breath sounds normal. No respiratory distress.   no wheezes.   no rales.    Musculoskeletal:  exhibits no edema.   Lymphadenopathy:    no cervical LAD  Neurological:   alert and oriented to person, place, and time.   Skin: Skin is warm and dry. No erythema.   Psychiatric:   normal mood and affect.   Nursing note and vitals reviewed.             Assessment/Plan:     1. Sore throat  Rapid strep, COVID, rsv, influenza   negative.   Likely viral     rx motrin 800mg tid prn      Differential diagnosis, natural history, supportive care, and indications for immediate follow-up discussed. All questions answered. Patient agrees with the plan of care.     Follow-up as needed if symptoms worsen or fail to improve to PCP, Urgent care or Emergency Room.     I have personally reviewed prior external notes and test results pertinent to today's visit.  I have independently reviewed and interpreted all diagnostics ordered during this urgent care acute visit.

## 2025-01-03 ENCOUNTER — HOSPITAL ENCOUNTER (OUTPATIENT)
Dept: LAB | Facility: MEDICAL CENTER | Age: 30
End: 2025-01-03
Attending: NURSE PRACTITIONER
Payer: MEDICAID

## 2025-01-03 ENCOUNTER — OFFICE VISIT (OUTPATIENT)
Dept: MEDICAL GROUP | Facility: MEDICAL CENTER | Age: 30
End: 2025-01-03
Attending: NURSE PRACTITIONER
Payer: MEDICAID

## 2025-01-03 VITALS
DIASTOLIC BLOOD PRESSURE: 80 MMHG | TEMPERATURE: 98.1 F | OXYGEN SATURATION: 100 % | BODY MASS INDEX: 28.64 KG/M2 | WEIGHT: 171.9 LBS | SYSTOLIC BLOOD PRESSURE: 110 MMHG | HEIGHT: 65 IN | HEART RATE: 68 BPM | RESPIRATION RATE: 16 BRPM

## 2025-01-03 DIAGNOSIS — Z13.6 SCREENING FOR CARDIOVASCULAR CONDITION: ICD-10-CM

## 2025-01-03 DIAGNOSIS — Z11.3 ROUTINE SCREENING FOR STI (SEXUALLY TRANSMITTED INFECTION): ICD-10-CM

## 2025-01-03 DIAGNOSIS — R42 DIZZINESS: ICD-10-CM

## 2025-01-03 LAB
25(OH)D3 SERPL-MCNC: 27 NG/ML (ref 30–100)
ALBUMIN SERPL BCP-MCNC: 4.6 G/DL (ref 3.2–4.9)
ALBUMIN/GLOB SERPL: 1.3 G/DL
ALP SERPL-CCNC: 82 U/L (ref 30–99)
ALT SERPL-CCNC: 25 U/L (ref 2–50)
ANION GAP SERPL CALC-SCNC: 10 MMOL/L (ref 7–16)
AST SERPL-CCNC: 22 U/L (ref 12–45)
BILIRUB SERPL-MCNC: 0.4 MG/DL (ref 0.1–1.5)
BUN SERPL-MCNC: 11 MG/DL (ref 8–22)
CALCIUM ALBUM COR SERPL-MCNC: 9.1 MG/DL (ref 8.5–10.5)
CALCIUM SERPL-MCNC: 9.6 MG/DL (ref 8.5–10.5)
CHLORIDE SERPL-SCNC: 104 MMOL/L (ref 96–112)
CHOLEST SERPL-MCNC: 158 MG/DL (ref 100–199)
CO2 SERPL-SCNC: 22 MMOL/L (ref 20–33)
CREAT SERPL-MCNC: 0.81 MG/DL (ref 0.5–1.4)
ERYTHROCYTE [DISTWIDTH] IN BLOOD BY AUTOMATED COUNT: 43.7 FL (ref 35.9–50)
EST. AVERAGE GLUCOSE BLD GHB EST-MCNC: 103 MG/DL
FASTING STATUS PATIENT QL REPORTED: NORMAL
GFR SERPLBLD CREATININE-BSD FMLA CKD-EPI: 101 ML/MIN/1.73 M 2
GLOBULIN SER CALC-MCNC: 3.6 G/DL (ref 1.9–3.5)
GLUCOSE SERPL-MCNC: 92 MG/DL (ref 65–99)
HBA1C MFR BLD: 5.2 % (ref 4–5.6)
HCT VFR BLD AUTO: 43.3 % (ref 37–47)
HCV AB SER QL: NONREACTIVE
HDLC SERPL-MCNC: 59 MG/DL
HGB BLD-MCNC: 13.8 G/DL (ref 12–16)
IRON SATN MFR SERPL: 24 % (ref 15–55)
IRON SERPL-MCNC: 107 UG/DL (ref 40–170)
LDLC SERPL CALC-MCNC: 85 MG/DL
MCH RBC QN AUTO: 26.2 PG (ref 27–33)
MCHC RBC AUTO-ENTMCNC: 31.9 G/DL (ref 32.2–35.5)
MCV RBC AUTO: 82.3 FL (ref 81.4–97.8)
PLATELET # BLD AUTO: 191 K/UL (ref 164–446)
PMV BLD AUTO: 12.6 FL (ref 9–12.9)
POTASSIUM SERPL-SCNC: 4.4 MMOL/L (ref 3.6–5.5)
PROT SERPL-MCNC: 8.2 G/DL (ref 6–8.2)
RBC # BLD AUTO: 5.26 M/UL (ref 4.2–5.4)
SODIUM SERPL-SCNC: 136 MMOL/L (ref 135–145)
T PALLIDUM AB SER QL IA: NONREACTIVE
T4 FREE SERPL-MCNC: 1.07 NG/DL (ref 0.93–1.7)
TIBC SERPL-MCNC: 437 UG/DL (ref 250–450)
TRIGL SERPL-MCNC: 72 MG/DL (ref 0–149)
TSH SERPL-ACNC: 4.92 UIU/ML (ref 0.35–5.5)
UIBC SERPL-MCNC: 330 UG/DL (ref 110–370)
VIT B12 SERPL-MCNC: 534 PG/ML (ref 211–911)
WBC # BLD AUTO: 5.7 K/UL (ref 4.8–10.8)

## 2025-01-03 PROCEDURE — 86780 TREPONEMA PALLIDUM: CPT

## 2025-01-03 PROCEDURE — 83540 ASSAY OF IRON: CPT

## 2025-01-03 PROCEDURE — 82607 VITAMIN B-12: CPT

## 2025-01-03 PROCEDURE — 83550 IRON BINDING TEST: CPT

## 2025-01-03 PROCEDURE — 83036 HEMOGLOBIN GLYCOSYLATED A1C: CPT

## 2025-01-03 PROCEDURE — 99212 OFFICE O/P EST SF 10 MIN: CPT | Performed by: NURSE PRACTITIONER

## 2025-01-03 PROCEDURE — 36415 COLL VENOUS BLD VENIPUNCTURE: CPT

## 2025-01-03 PROCEDURE — 80061 LIPID PANEL: CPT

## 2025-01-03 PROCEDURE — 99214 OFFICE O/P EST MOD 30 MIN: CPT | Performed by: NURSE PRACTITIONER

## 2025-01-03 PROCEDURE — 82306 VITAMIN D 25 HYDROXY: CPT

## 2025-01-03 PROCEDURE — 84439 ASSAY OF FREE THYROXINE: CPT

## 2025-01-03 PROCEDURE — 85027 COMPLETE CBC AUTOMATED: CPT

## 2025-01-03 PROCEDURE — 3079F DIAST BP 80-89 MM HG: CPT | Performed by: NURSE PRACTITIONER

## 2025-01-03 PROCEDURE — 84443 ASSAY THYROID STIM HORMONE: CPT

## 2025-01-03 PROCEDURE — 80053 COMPREHEN METABOLIC PANEL: CPT

## 2025-01-03 PROCEDURE — 86803 HEPATITIS C AB TEST: CPT

## 2025-01-03 PROCEDURE — 3074F SYST BP LT 130 MM HG: CPT | Performed by: NURSE PRACTITIONER

## 2025-01-03 ASSESSMENT — ANXIETY QUESTIONNAIRES
7. FEELING AFRAID AS IF SOMETHING AWFUL MIGHT HAPPEN: MORE THAN HALF THE DAYS
4. TROUBLE RELAXING: NOT AT ALL
3. WORRYING TOO MUCH ABOUT DIFFERENT THINGS: SEVERAL DAYS
5. BEING SO RESTLESS THAT IT IS HARD TO SIT STILL: NOT AT ALL
GAD7 TOTAL SCORE: 8
2. NOT BEING ABLE TO STOP OR CONTROL WORRYING: SEVERAL DAYS
1. FEELING NERVOUS, ANXIOUS, OR ON EDGE: NEARLY EVERY DAY
6. BECOMING EASILY ANNOYED OR IRRITABLE: SEVERAL DAYS

## 2025-01-03 ASSESSMENT — PATIENT HEALTH QUESTIONNAIRE - PHQ9: CLINICAL INTERPRETATION OF PHQ2 SCORE: 0

## 2025-01-03 NOTE — PROGRESS NOTES
Verbal consent was acquired by the patient to use [employer] ambient listening note generation during this visit     Chief Complaint   Patient presents with    [facility]       Subjective:     HPI:   History of Present Illness  The patient presents for evaluation of blood pressure issues, anxiety, and health maintenance.    She reports a family history of hypertension, with recent observations of fluctuating blood pressure levels. Typically, her blood pressure is elevated, but she has noticed a recent trend towards lower readings. She recalls an episode of severe chest pain and lightheadedness coinciding with a low blood pressure reading, which was managed at home with rest. Despite this being an isolated incident, subsequent readings have remained low. She has been using a wrist blood pressure cuff for home monitoring. She has not had any recent illnesses, although she did experience a brief influenza infection two weeks ago. She is not currently on any medications or vitamins. She has been practicing intermittent fasting for the past three years, consuming food between 4:30 PM and 9:30 PM, and reports feeling better overall with regular bowel movements. She maintains adequate hydration and limits her caffeine intake to one cup of coffee per day. She does not consume excessive amounts of salt or soda. She has not experienced any other episodes of chest pain. Her medical history includes preeclampsia during her pregnancy, which did not necessitate early delivery.    She also reports experiencing anxiety, which she believes is well-managed and does not require medication.    She has not undergone any recent blood work. She has not had any surgical procedures in the past. She has a history of depression, which she experiences as a single day of sadness during her menstrual cycle. Her last Pap smear was conducted approximately three years ago during her last pregnancy. She works in a  environment and  "frequently washes her hands, resulting in dryness and cracking. She has not had any issues with her ovaries.    She reports swelling in her fingers and toes, particularly noticeable when bending them.    SOCIAL HISTORY  She works in a  environment.    FAMILY HISTORY  Her grandmother has high blood pressure and had a pacemaker placed in her 60s. Her mother and brother also have high blood pressure. Depression runs on her mother's side, affecting her mother and brothers. Her grandmother had a hysterectomy, and her mother has had ovarian cysts.    IMMUNIZATIONS  She declined the influenza vaccine.        No problems updated.    ROS  See HPI     No Known Allergies    Current medicines (including changes today)  No current outpatient medications on file.     No current facility-administered medications for this visit.       Social History     Tobacco Use    Smoking status: Never    Smokeless tobacco: Never   Vaping Use    Vaping status: Never Used   Substance Use Topics    Alcohol use: Yes     Comment: occ    Drug use: No       Patient Active Problem List    Diagnosis Date Noted    Hx of postpartum hemorrhage, currently pregnant, second trimester 01/19/2021    Hx of transfusion of packed red blood cells 01/19/2021       Family History   Problem Relation Age of Onset    Hypertension Mother     Psychiatric Illness Mother     No Known Problems Father     Psychiatric Illness Brother     Hypertension Maternal Grandmother     Heart Disease Maternal Grandmother           Objective:     /80 (BP Location: Right arm, Patient Position: Sitting, BP Cuff Size: Adult)   Pulse 68   Temp 36.7 °C (98.1 °F) (Temporal)   Resp 16   Ht 1.651 m (5' 5\")   Wt 78 kg (171 lb 14.4 oz)   SpO2 100%  Body mass index is 28.61 kg/m².    Physical Exam:  Physical Exam  Vitals reviewed.   Constitutional:       General: She is awake.      Appearance: Normal appearance. She is well-developed.   HENT:      Head: Normocephalic.   Eyes:    "   Conjunctiva/sclera: Conjunctivae normal.   Cardiovascular:      Rate and Rhythm: Normal rate.   Pulmonary:      Effort: Pulmonary effort is normal. No respiratory distress.   Musculoskeletal:      Cervical back: Neck supple.   Skin:     General: Skin is warm and dry.   Neurological:      Mental Status: She is alert and oriented to person, place, and time.   Psychiatric:         Mood and Affect: Mood normal.         Behavior: Behavior normal. Behavior is cooperative.              Assessment and Plan:     The following treatment plan was discussed:    Problem List Items Addressed This Visit    None  Visit Diagnoses       Screening for cardiovascular condition        Relevant Orders    HEMOGLOBIN A1C    Lipid Profile    Comp Metabolic Panel    CBC WITHOUT DIFFERENTIAL    Routine screening for STI (sexually transmitted infection)        Relevant Orders    HEP C VIRUS ANTIBODY    T.PALLIDUM AB MEHNAZ (SCREENING)    Dizziness        Relevant Orders    VITAMIN D,25 HYDROXY (DEFICIENCY)    TSH    FREE THYROXINE    VITAMIN B12    CBC WITHOUT DIFFERENTIAL    IRON/TOTAL IRON BIND            Assessment & Plan  1. Blood pressure management.  Her blood pressure readings were within normal range during this visit. The observed swelling could potentially be attributed to a high sodium intake. There is no evidence of a heart murmur or valve abnormalities that could be contributing to the swelling. The chest pain experienced during a period of low blood pressure may be a symptom of hypotension. It is noteworthy that her grandmother required a pacemaker in her 60s, suggesting a possible hereditary predisposition to cardiac arrhythmias. She was advised to bring her home blood pressure monitor to the next appointment for calibration against our office equipment. She was also encouraged to maintain a daily log of her blood pressure readings. Dietary recommendations included reducing sodium intake to less than 2 g per day, avoiding canned  and frozen foods, and limiting consumption of caffeinated beverages. A comprehensive panel of laboratory tests will be ordered to investigate potential underlying causes of her symptoms.    2. Anxiety.  She reports experiencing anxiety but feels it is controlled and does not require medication at this time. She was advised to monitor her symptoms and seek further evaluation if her anxiety becomes unmanageable or if she wishes to discuss it further.    3. Health maintenance.  She was informed that she is due for a Pap smear, which can be performed either in our office or by her OB-GYN. She was also offered the influenza vaccine, which she declined.    4. Edema.  The observed swelling could potentially be attributed to a high sodium intake. There is no evidence of a heart murmur or valve abnormalities that could be contributing to the swelling. Dietary recommendations included reducing sodium intake to less than 2 g per day, avoiding canned and frozen foods, and limiting consumption of caffeinated beverages.    Any change or worsening of signs or symptoms, patient encouraged to follow-up or report to emergency room for further evaluation. Patient verbalizes understanding and agrees.      PLEASE NOTE: This dictation was created using voice recognition software. I have made every reasonable attempt to correct obvious errors, but I expect that there are errors of grammar and possibly content that I did not discover before finalizing the note.

## 2025-03-20 ENCOUNTER — APPOINTMENT (OUTPATIENT)
Dept: RADIOLOGY | Facility: IMAGING CENTER | Age: 30
End: 2025-03-20
Attending: NURSE PRACTITIONER
Payer: MEDICAID

## 2025-03-20 ENCOUNTER — OFFICE VISIT (OUTPATIENT)
Dept: URGENT CARE | Facility: CLINIC | Age: 30
End: 2025-03-20
Payer: MEDICAID

## 2025-03-20 VITALS
HEART RATE: 114 BPM | HEIGHT: 65 IN | BODY MASS INDEX: 28.28 KG/M2 | RESPIRATION RATE: 16 BRPM | WEIGHT: 169.75 LBS | SYSTOLIC BLOOD PRESSURE: 110 MMHG | TEMPERATURE: 100.3 F | OXYGEN SATURATION: 97 % | DIASTOLIC BLOOD PRESSURE: 76 MMHG

## 2025-03-20 DIAGNOSIS — J18.9 PNEUMONIA OF LEFT UPPER LOBE DUE TO INFECTIOUS ORGANISM: ICD-10-CM

## 2025-03-20 DIAGNOSIS — R50.9 FEVER IN ADULT: ICD-10-CM

## 2025-03-20 DIAGNOSIS — R05.1 ACUTE COUGH: ICD-10-CM

## 2025-03-20 LAB
FLUAV RNA SPEC QL NAA+PROBE: NEGATIVE
FLUBV RNA SPEC QL NAA+PROBE: NEGATIVE
RSV RNA SPEC QL NAA+PROBE: NEGATIVE
SARS-COV-2 RNA RESP QL NAA+PROBE: NEGATIVE

## 2025-03-20 PROCEDURE — 3074F SYST BP LT 130 MM HG: CPT | Performed by: NURSE PRACTITIONER

## 2025-03-20 PROCEDURE — 71046 X-RAY EXAM CHEST 2 VIEWS: CPT | Mod: TC | Performed by: RADIOLOGY

## 2025-03-20 PROCEDURE — 99214 OFFICE O/P EST MOD 30 MIN: CPT | Performed by: NURSE PRACTITIONER

## 2025-03-20 PROCEDURE — 87637 SARSCOV2&INF A&B&RSV AMP PRB: CPT | Mod: QW | Performed by: NURSE PRACTITIONER

## 2025-03-20 PROCEDURE — 3078F DIAST BP <80 MM HG: CPT | Performed by: NURSE PRACTITIONER

## 2025-03-20 RX ORDER — DOXYCYCLINE HYCLATE 100 MG
100 TABLET ORAL 2 TIMES DAILY
Qty: 14 TABLET | Refills: 0 | Status: SHIPPED | OUTPATIENT
Start: 2025-03-20 | End: 2025-03-27

## 2025-03-20 RX ORDER — BENZONATATE 100 MG/1
100 CAPSULE ORAL 3 TIMES DAILY PRN
Qty: 60 CAPSULE | Refills: 0 | Status: SHIPPED | OUTPATIENT
Start: 2025-03-20

## 2025-03-20 ASSESSMENT — FIBROSIS 4 INDEX: FIB4 SCORE: 0.67

## 2025-03-20 NOTE — LETTER
March 20, 2025         Patient: Kadi Soliman   YOB: 1995   Date of Visit: 3/20/2025           To Whom it May Concern:    Kadi Soliman was seen in my clinic on 3/20/2025. She may be excused from work today, tomorrow, and Monday.     If you have any questions or concerns, please don't hesitate to call.        Sincerely,           BRITTNEE Boyce.  Electronically Signed

## 2025-03-20 NOTE — PROGRESS NOTES
Chief Complaint   Patient presents with    Fever     Since Sunday night    Headache    Cough     Is making her chest hurt    Fatigue       HISTORY OF PRESENT ILLNESS: Patient is a pleasant 29 y.o. female who presents today due to symptoms which started five days ago. Pt reports a fever, chills, body aches. Reports associated painful cough, sore throat, nasal congestion, headache, and fatigue. Denies blood in sputum, chest pain, shortness of breath, wheezing, nausea, vomiting, or diarrhea. Denies h/o asthma/copd/CAP. No immunocompromise. Has tried OTC cold/flu medications without significant relief of symptoms. No recent ABX use. She did not receive a flu vaccination this year. She works as a . No other aggravating or alleviating factors.       Patient Active Problem List    Diagnosis Date Noted    Hx of postpartum hemorrhage, currently pregnant, second trimester 01/19/2021    Hx of transfusion of packed red blood cells 01/19/2021       Allergies:Patient has no known allergies.    No current [facility]-ordered outpatient medications on file.     No current [facility]-ordered facility-administered medications on file.       Past Medical History:   Diagnosis Date    Preeclampsia        Social History     Tobacco Use    Smoking status: Never    Smokeless tobacco: Never   Vaping Use    Vaping status: Never Used   Substance Use Topics    Alcohol use: Yes     Comment: occ    Drug use: No       Family Status   Relation Name Status    Mo  Alive    Fa  Alive    Bro 3 Alive    MGMo  Alive   No partnership data on file     Family History   Problem Relation Age of Onset    Hypertension Mother     Psychiatric Illness Mother     No Known Problems Father     Psychiatric Illness Brother     Hypertension Maternal Grandmother     Heart Disease Maternal Grandmother        ROS:  Review of Systems   Constitutional: Positive for subjective fever, chills, fatigue, malaise. Negative for weight loss.  HENT: Positive for congestion and  "sore throat. Negative for ear pain, nosebleeds, and neck pain.    Eyes: Negative for vision changes.   Cardiovascular: Negative for chest pain, palpitations, orthopnea and leg swelling.   Respiratory: Positive for cough, painful. Negative for shortness of breath and wheezing.   Gastrointestinal: Negative for abdominal pain, nausea, vomiting or diarrhea.   Skin: Negative for rash, diaphoresis.     Exam:  /76 (BP Location: Left arm, Patient Position: Sitting, BP Cuff Size: Adult)   Pulse (!) 114   Temp 37.9 °C (100.3 °F) (Temporal)   Resp 16   Ht 1.651 m (5' 5\")   Wt 77 kg (169 lb 12.1 oz)   SpO2 97%   General: well-nourished, well-developed female, appears uncomfortable, non-toxic in appearance.   Head: normocephalic, atraumatic.  Eyes: PERRLA, EOM within normal limits, no conjunctival injection, no scleral icterus, visual fields and acuity grossly intact.  Ears: normal shape and symmetry, no tenderness, no discharge. External canals are without any significant edema or erythema. Tympanic membranes are without any inflammation, no effusion. Gross auditory acuity is intact.  Nose: symmetrical without tenderness, mild discharge, erythema present bilateral nares.  Mouth/Throat: lips dry, reasonable hygiene, no exudates or tonsillar enlargement. Erythema present.   Neck: no masses, range of motion within normal limits, no tracheal deviation.  Lymph: mild cervical adenopathy. No supraclavicular adenopathy.   Neuro: alert and oriented. Cranial nerves 1-12 grossly intact.   Cardiovascular: tachycardic rate and regular rhythm without murmurs, rubs, or gallops. No edema.   Pulmonary: no distress. Chest is symmetrical with respiration, no wheezes, crackles, or rhonchi. Diminished left lobes.  Musculoskeletal: appropriate muscle tone, gait is stable.  Skin: warm, dry, intact, no clubbing, no cyanosis.   Psych: appropriate mood, affect, judgement.           DX chest radiology reading \"LEFT upper lobe " "pneumonia.\"        Assessment/Plan:  1. Pneumonia of left upper lobe due to infectious organism  POCT CoV-2, Flu A/B, RSV by PCR    DX-CHEST-2 VIEWS    doxycycline (VIBRAMYCIN) 100 MG Tab      2. Acute cough  benzonatate (TESSALON) 100 MG Cap                Patient presents with respiratory symptoms over five days. Viral panel negative. Chest x-ray notes pneumonia. Patient is tachycardia in office, otherwise appears well. Feel she is appropriate for outpatient treatment with strict ER precautions, she is in agreement. She is encouraged to increase her fluid intake as suspect she is slightly dehydrated. Doxy as directed. Tessalon for cough. Rest encouraged, work note provided. Discussed natural progression and sx care. She is instructed to have repeat chest x-ray in 2 weeks.   Supportive care, differential diagnoses, and indications for immediate follow-up discussed with patient.   Pathogenesis of diagnosis discussed including typical length and natural progression.   Instructed to return to clinic or nearest emergency department for any change in condition, further concerns, or worsening of symptoms.  Patient states understanding of the plan of care and discharge instructions.  Instructed to make an appointment, for follow up, with her primary care provider.            Please note that this dictation was created using voice recognition software. I have made every reasonable attempt to correct obvious errors, but I expect that there are errors of grammar and possibly content that I did not discover before finalizing the note.       BRITTNEE Boyce.      "

## 2025-04-03 ENCOUNTER — APPOINTMENT (OUTPATIENT)
Dept: RADIOLOGY | Facility: IMAGING CENTER | Age: 30
End: 2025-04-03
Attending: PHYSICIAN ASSISTANT
Payer: MEDICAID

## 2025-04-03 ENCOUNTER — OFFICE VISIT (OUTPATIENT)
Dept: URGENT CARE | Facility: CLINIC | Age: 30
End: 2025-04-03
Payer: MEDICAID

## 2025-04-03 VITALS
DIASTOLIC BLOOD PRESSURE: 82 MMHG | RESPIRATION RATE: 16 BRPM | OXYGEN SATURATION: 97 % | HEIGHT: 65 IN | TEMPERATURE: 99.7 F | BODY MASS INDEX: 28.8 KG/M2 | SYSTOLIC BLOOD PRESSURE: 118 MMHG | WEIGHT: 172.84 LBS | HEART RATE: 94 BPM

## 2025-04-03 DIAGNOSIS — J18.9 PNEUMONIA OF LEFT LUNG DUE TO INFECTIOUS ORGANISM, UNSPECIFIED PART OF LUNG: ICD-10-CM

## 2025-04-03 DIAGNOSIS — J22 LRTI (LOWER RESPIRATORY TRACT INFECTION): ICD-10-CM

## 2025-04-03 PROCEDURE — 99214 OFFICE O/P EST MOD 30 MIN: CPT | Performed by: PHYSICIAN ASSISTANT

## 2025-04-03 PROCEDURE — 3074F SYST BP LT 130 MM HG: CPT | Performed by: PHYSICIAN ASSISTANT

## 2025-04-03 PROCEDURE — 71046 X-RAY EXAM CHEST 2 VIEWS: CPT | Mod: TC | Performed by: RADIOLOGY

## 2025-04-03 PROCEDURE — 3079F DIAST BP 80-89 MM HG: CPT | Performed by: PHYSICIAN ASSISTANT

## 2025-04-03 RX ORDER — AZITHROMYCIN 250 MG/1
TABLET, FILM COATED ORAL
Qty: 6 TABLET | Refills: 0 | Status: SHIPPED | OUTPATIENT
Start: 2025-04-03

## 2025-04-03 ASSESSMENT — ENCOUNTER SYMPTOMS
DIARRHEA: 0
HEADACHES: 0
NAUSEA: 0
WHEEZING: 0
COUGH: 1
DIAPHORESIS: 0
CHILLS: 0
SINUS PAIN: 0
MYALGIAS: 0
SORE THROAT: 0
SHORTNESS OF BREATH: 0
DIZZINESS: 0
VOMITING: 0
ABDOMINAL PAIN: 0
SPUTUM PRODUCTION: 1
PALPITATIONS: 0
FEVER: 1

## 2025-04-03 ASSESSMENT — FIBROSIS 4 INDEX: FIB4 SCORE: 0.67

## 2025-04-03 NOTE — PROGRESS NOTES
Subjective:     CHIEF COMPLAINT  Chief Complaint   Patient presents with    Cough     Was seen 2 weeks ago an now cough is back       HPI  Kadi Soliman is a very pleasant 29 y.o. female who presents to the clinic with a recurrent cough that has been ongoing for the last 2 weeks.  Patient recently seen in clinic on 3/20/2025 and diagnosed with left upper lobe pneumonia.  She was treated with a 7-day course of doxycycline.  States symptoms are gradually improving while on the antibiotic and for a few days after completion until returning 3 days ago.  3 days ago developed a worsening cough that is becoming more frequent and productive.  Has been experiencing low-grade fevers.  No shortness of breath or chest pain.  Denies any body aches or chills.  She does describe some associated sinus congestion, no sore throat.  No ill contacts in the house.    REVIEW OF SYSTEMS  Review of Systems   Constitutional:  Positive for fever and malaise/fatigue. Negative for chills and diaphoresis.   HENT:  Positive for congestion. Negative for ear pain, sinus pain and sore throat.    Respiratory:  Positive for cough and sputum production. Negative for shortness of breath and wheezing.    Cardiovascular:  Negative for chest pain and palpitations.   Gastrointestinal:  Negative for abdominal pain, diarrhea, nausea and vomiting.   Musculoskeletal:  Negative for myalgias.   Neurological:  Negative for dizziness and headaches.       PAST MEDICAL HISTORY  Patient Active Problem List    Diagnosis Date Noted    Hx of postpartum hemorrhage, currently pregnant, second trimester 01/19/2021    Hx of transfusion of packed red blood cells 01/19/2021       SURGICAL HISTORY  patient denies any surgical history    ALLERGIES  No Known Allergies    CURRENT MEDICATIONS  Home Medications       Reviewed by Nishant Hernandez P.A.-C. (Physician Assistant) on 04/03/25 at 1621  Med List Status: <None>     Medication Last Dose Status   benzonatate (TESSALON)  "100 MG Cap Taking Active                    SOCIAL HISTORY  Social History     Tobacco Use    Smoking status: Never    Smokeless tobacco: Never   Vaping Use    Vaping status: Never Used   Substance and Sexual Activity    Alcohol use: Yes     Comment: occ    Drug use: No    Sexual activity: Yes     Partners: Male     Comment: none. unplanned pregnancy       FAMILY HISTORY  Family History   Problem Relation Age of Onset    Hypertension Mother     Psychiatric Illness Mother     No Known Problems Father     Psychiatric Illness Brother     Hypertension Maternal Grandmother     Heart Disease Maternal Grandmother           Objective:     VITAL SIGNS: /82 (BP Location: Right arm, Patient Position: Sitting, BP Cuff Size: Adult)   Pulse 94   Temp 37.6 °C (99.7 °F) (Temporal)   Resp 16   Ht 1.651 m (5' 5\")   Wt 78.4 kg (172 lb 13.5 oz)   SpO2 97%   BMI 28.76 kg/m²     PHYSICAL EXAM  Physical Exam  Constitutional:       General: She is not in acute distress.     Appearance: Normal appearance. She is not ill-appearing, toxic-appearing or diaphoretic.   HENT:      Head: Normocephalic and atraumatic.      Right Ear: Tympanic membrane, ear canal and external ear normal.      Left Ear: Tympanic membrane, ear canal and external ear normal.      Nose: Congestion present. No rhinorrhea.      Mouth/Throat:      Mouth: Mucous membranes are moist.      Pharynx: No oropharyngeal exudate or posterior oropharyngeal erythema.   Eyes:      Conjunctiva/sclera: Conjunctivae normal.   Cardiovascular:      Rate and Rhythm: Normal rate and regular rhythm.      Pulses: Normal pulses.      Heart sounds: Normal heart sounds.   Pulmonary:      Effort: Pulmonary effort is normal.      Breath sounds: Normal breath sounds. No wheezing, rhonchi or rales.   Musculoskeletal:      Cervical back: Normal range of motion. No muscular tenderness.   Lymphadenopathy:      Cervical: No cervical adenopathy.   Skin:     General: Skin is warm and dry.      " Capillary Refill: Capillary refill takes less than 2 seconds.   Neurological:      Mental Status: She is alert.   Psychiatric:         Mood and Affect: Mood normal.         Thought Content: Thought content normal.       RADIOLOGY RESULTS   DX-CHEST-2 VIEWS  Result Date: 4/3/2025  4/3/2025 4:29 PM HISTORY/REASON FOR EXAM:  Cough TECHNIQUE/EXAM DESCRIPTION AND NUMBER OF VIEWS: Two views of the chest. COMPARISON:  3/20/2025. FINDINGS: Residual airspace opacity in the left midlung No pleural effusions, no pneumothorax are appreciated. Normal cardiopericardial silhouette.     Residual airspace opacity in the left midlung, likely resolving pneumonia    DX-CHEST-2 VIEWS  Result Date: 3/20/2025  3/20/2025 8:49 AM HISTORY/REASON FOR EXAM:  Cough Congestion. TECHNIQUE/EXAM DESCRIPTION AND NUMBER OF VIEWS: Two views of the chest. COMPARISON:  None available. FINDINGS: Cardiomediastinal contour is within normal limits. Ill-defined opacity in the LEFT midlung, anterior and lateral view. No pleural fluid collection or pneumothorax. No major bony abnormality is seen.     LEFT upper lobe pneumonia.         Assessment/Plan:     1. Pneumonia of left lung due to infectious organism, unspecified part of lung  DX-CHEST-2 VIEWS    azithromycin (ZITHROMAX) 250 MG Tab    amoxicillin-clavulanate (AUGMENTIN) 875-125 MG Tab          MDM/Comments:    Very pleasant 29-year-old female presented to the clinic with a recurrent cough that has been ongoing for the last 2 weeks.  Recently diagnosed with pneumonia on 3/20/2025.  Completed a 7-day course of doxycycline with some improvement however cough never fully resolved.  Symptoms worsened over the last few days with worsening cough, sputum production and subjective fever.  In clinic lungs are clear to auscultation without any wheezing, rhonchi or rales appreciated.  SpO2 97% on room air.  Chest x-ray performed that demonstrates residual airspace opacity in the left midlung that appears to be  resolving pneumonia.  We discussed the potential for new onset viral illness over the last few days versus unresolved pneumonia.  We elected to begin a 5-day course of dual antibiotic treatment with azithromycin and Augmentin.  OTC supportive recommendations were discussed.  Return precautions discussed.    Differential diagnosis, natural history, supportive care, and indications for immediate follow-up discussed. All questions answered. Patient agrees with the plan of care.    Follow-up as needed if symptoms worsen or fail to improve to PCP, Urgent care or Emergency Room.    I have personally reviewed prior external notes and test results pertinent to today's visit.  I have independently reviewed and interpreted all diagnostics ordered during this urgent care acute visit.   Discussed management options (risks,benefits, and alternatives to treatment). Pt expresses understanding and the treatment plan was agreed upon. Questions were encouraged and answered to pt's satisfaction.    Please note that this dictation was created using voice recognition software. I have made a reasonable attempt to correct obvious errors, but I expect that there are errors of grammar and possibly content that I did not discover before finalizing the note.